# Patient Record
Sex: FEMALE | Race: WHITE | Employment: FULL TIME | URBAN - METROPOLITAN AREA
[De-identification: names, ages, dates, MRNs, and addresses within clinical notes are randomized per-mention and may not be internally consistent; named-entity substitution may affect disease eponyms.]

---

## 2017-07-25 ENCOUNTER — OFFICE VISIT (OUTPATIENT)
Dept: VASCULAR SURGERY | Facility: CLINIC | Age: 32
End: 2017-07-25
Payer: COMMERCIAL

## 2017-07-25 DIAGNOSIS — Z53.9 ERRONEOUS ENCOUNTER--DISREGARD: Primary | ICD-10-CM

## 2017-07-25 PROCEDURE — 99213 OFFICE O/P EST LOW 20 MIN: CPT | Performed by: SURGERY

## 2017-07-25 NOTE — MR AVS SNAPSHOT
After Visit Summary   7/25/2017    Blossom Hale    MRN: [id number]           Patient Information     Date Of Birth          1985        Visit Information        Provider Department      7/25/2017 2:00 PM London Pack MD [facility]      Today's Diagnoses     ERRONEOUS ENCOUNTER--DISREGARD    -  1       Follow-ups after your visit        Who to contact     If you have questions or need follow up information about today's clinic visit or your schedule please contact [facility] directly at [phone].  Normal or non-critical lab and imaging results will be communicated to you by Job[employer], letter or phone within 4 business days after the clinic has received the results. If you do not hear from us within 7 days, please contact the clinic through [employer] or phone. If you have a critical or abnormal lab result, we will notify you by phone as soon as possible.  Submit refill requests through [employer] or call your pharmacy and they will forward the refill request to us. Please allow 3 business days for your refill to be completed.          Additional Information About Your Visit        MyChart Information     [employer] gives you secure access to your electronic health record. If you see a primary care provider, you can also send messages to your care team and make appointments. If you have questions, please call your primary care clinic.  If you do not have a primary care provider, please call [phone] and they will assist you.        Care EveryWhere ID     This is your Care EveryWhere ID. This could be used by other organizations to access your [place] medical records  [id number]         Blood Pressure from Last 3 Encounters:   10/23/17 129/64   02/29/16 114/71   01/15/15 (!) 125/94    Weight from Last 3 Encounters:   10/23/17 102.2 kg (225 lb 5 oz)   02/29/16 106 kg (233 lb 9.6 oz)   01/14/15 95.3 kg  (210 lb)              Today, you had the following     No orders found for display       Primary Care Provider Office Phone # Fax #    Andrea Leach [phone]       [address] [address]        Equal Access to Services     HERMELINDA ODONNELL : Hadii aad ku hadmariamo Soomaali, waaxda luqadaha, qaybta kaalmada adeegyada, waxva hermosillo belindan nakul garzonandzrejinchole avilez. So [facility] [phone].    ATENCIÓN: Si habla español, tiene a castro disposición servicios gratuitos de asistencia lingüística. Llame al [phone].    We comply with applicable federal civil rights laws and [place] laws. We do not discriminate on the basis of race, color, national origin, age, disability, sex, sexual orientation, or gender identity.            Thank you!     Thank you for choosing [facility]  for your care. Our goal is always to provide you with excellent care. Hearing back from our patients is one way we can continue to improve our services. Please take a few minutes to complete the written survey that you may receive in the mail after your visit with us. Thank you!             Your Updated Medication List - Protect others around you: Learn how to safely use, store and throw away your medicines at www.disposemymeds.org.          This list is accurate as of 7/25/17 11:59 PM.  Always use your most recent med list.                   Brand Name Dispense Instructions for use Diagnosis    acetaminophen 325 MG tablet    TYLENOL     Take 325 mg by mouth every 6 hours as needed for mild pain        CALCIUM 600 PO      Take by mouth 2 times daily        EPINEPHrine 0.3 MG/0.3ML injection 2-pack    EPIPEN/ADRENACLICK/or ANY BX GENERIC EQUIV    2 each    Inject 0.3 mLs (0.3 mg) into the muscle once as needed for anaphylaxis        ergocalciferol 78603 UNITS capsule    ERGOCALCIFEROL     Take 50,000 Units by mouth once a week        metFORMIN 500 MG 24 hr tablet     GLUCOPHAGE-XR     Take 500 mg by mouth Take 2 tablets in AM and 2 tablets in PM        mometasone-formoterol 200-5 MCG/ACT oral inhaler    DULERA     Inhale 2 puffs into the lungs 2 times daily        PROAIR  (90 BASE) MCG/ACT Inhaler   Generic drug:  albuterol     1    INHALE 1 TO 2 PUFFS EVERY 4 TO 6 HOURS AS NEEDED    Intermittent asthma

## 2017-07-27 NOTE — PROGRESS NOTES
[facility] Office Note    Blossom Hale   1985    Blossom is familiar to me having undergone radiofrequency ablation of the right anterior accessory saphenous vein in 2016 for right leg pain and varicose veins.  72 hours following her procedure, she had an ultrasound confirming that the anterior accessory saphenous vein was closed.  She did not return for her six week follow-up appointment, therefore we were not able to assess whether she had improvement of her symptoms.    She presents now calls of ongoing pain in the anterolateral right thigh and extending into the lateral aspect of her right leg.  She says that the pain is somewhat improved but still very aggravating, despite the use of compression hose on a regular basis.  She has been exercising and has to elevate her leg to control the pain, difficult because of long hours she spends on her feet at work.  This is continuing to interfere with her activities of daily living in this way.    Physical exam  In her right lower extremity she has 4-5 mm varicosities coursing from the distal anterior right thigh, lateral to the right knee noted anterolateral aspect of her right leg.  These are smaller than appreciated prior to her surgery but still present.  There are no venous stasis changes.  She does have 1+ edema of her right ankle.    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  This is likely the vein that is continuing to give her symptoms.    Impression  Ongoing pain in right lower extremity secondary to right leg varicose veins.  She has had ablation of her right anterior accessory saphenous vein but this has not relieved her discomfort and needs treatment of the incompetent tributary.  I feel that this would be best treated with ultrasound-guided sclerotherapy or ambulatory phlebectomy.  Details of each including risks of each were  discussed.  She feels that sclerotherapy would be her preferred option.  Because of her previous treatment and failed conservative management, this treatment will be considered medically necessary.    Risks of allergic reaction, deep vein thrombosis, superficial thrombophlebitis, hyperpigmentation and the need for more than one session of sclerotherapy were discussed.  She appears to understand and is anxious to have this taken care of to relieve her pain.    BEBA Pack M.D.    Please send a copy of this dictation to MD BEBA Machado M.D.

## 2017-08-22 ENCOUNTER — OFFICE VISIT (OUTPATIENT)
Dept: VASCULAR SURGERY | Facility: CLINIC | Age: 32
End: 2017-08-22
Payer: COMMERCIAL

## 2017-08-22 DIAGNOSIS — Z53.9 ERRONEOUS ENCOUNTER--DISREGARD: Primary | ICD-10-CM

## 2017-08-22 NOTE — MR AVS SNAPSHOT
After Visit Summary   8/22/2017    Blossom Hale    MRN: [id number]           Patient Information     Date Of Birth          1985        Visit Information        Provider Department      8/22/2017 2:00 PM London Pack [facility]      Today's Diagnoses     ERRONEOUS ENCOUNTER--DISREGARD    -  1       Follow-ups after your visit        Who to contact     If you have questions or need follow up information about today's clinic visit or your schedule please contact [facility] directly at [phone].  Normal or non-critical lab and imaging results will be communicated to you by Plexxhart, letter or phone within 4 business days after the clinic has received the results. If you do not hear from us within 7 days, please contact the clinic through [employer] or phone. If you have a critical or abnormal lab result, we will notify you by phone as soon as possible.  Submit refill requests through [employer] or call your pharmacy and they will forward the refill request to us. Please allow 3 business days for your refill to be completed.          Additional Information About Your Visit        MyChart Information     [employer] gives you secure access to your electronic health record. If you see a primary care provider, you can also send messages to your care team and make appointments. If you have questions, please call your primary care clinic.  If you do not have a primary care provider, please call [phone] and they will assist you.        Care EveryWhere ID     This is your Care EveryWhere ID. This could be used by other organizations to access your [place] medical records  [id number]         Blood Pressure from Last 3 Encounters:   10/23/17 129/64   02/29/16 114/71   01/15/15 (!) 125/94    Weight from Last 3 Encounters:   10/23/17 102.2 kg (225 lb 5 oz)   02/29/16 106 kg (233 lb 9.6 oz)   01/14/15 95.3 kg  (210 lb)              Today, you had the following     No orders found for display       Primary Care Provider Office Phone # Fax #    Andrea Leach [phone]       [address] [address]        Equal Access to Services     HERMELINDA ODONNELL : Hadii aad ku hadmariamo Soomaali, waaxda luqadaha, qaybta kaalmada adeegyada, waxva hermosillo belindan nakul garzonandrzejnichole avilez. So [facility] [phone].    ATENCIÓN: Si habla español, tiene a castro disposición servicios gratuitos de asistencia lingüística. Llame al [phone].    We comply with applicable federal civil rights laws and [place] laws. We do not discriminate on the basis of race, color, national origin, age, disability, sex, sexual orientation, or gender identity.            Thank you!     Thank you for choosing [facility]  for your care. Our goal is always to provide you with excellent care. Hearing back from our patients is one way we can continue to improve our services. Please take a few minutes to complete the written survey that you may receive in the mail after your visit with us. Thank you!             Your Updated Medication List - Protect others around you: Learn how to safely use, store and throw away your medicines at www.disposemymeds.org.          This list is accurate as of 8/22/17 11:59 PM.  Always use your most recent med list.                   Brand Name Dispense Instructions for use Diagnosis    acetaminophen 325 MG tablet    TYLENOL     Take 325 mg by mouth every 6 hours as needed for mild pain        CALCIUM 600 PO      Take by mouth 2 times daily        EPINEPHrine 0.3 MG/0.3ML injection 2-pack    EPIPEN/ADRENACLICK/or ANY BX GENERIC EQUIV    2 each    Inject 0.3 mLs (0.3 mg) into the muscle once as needed for anaphylaxis        ergocalciferol 24981 UNITS capsule    ERGOCALCIFEROL     Take 50,000 Units by mouth once a week        metFORMIN 500 MG 24 hr tablet     GLUCOPHAGE-XR     Take 500 mg by mouth Take 2 tablets in AM and 2 tablets in PM        mometasone-formoterol 200-5 MCG/ACT oral inhaler    DULERA     Inhale 2 puffs into the lungs 2 times daily        PROAIR  (90 BASE) MCG/ACT Inhaler   Generic drug:  albuterol     1    INHALE 1 TO 2 PUFFS EVERY 4 TO 6 HOURS AS NEEDED    Intermittent asthma

## 2017-09-03 ENCOUNTER — HEALTH MAINTENANCE LETTER (OUTPATIENT)
Age: 32
End: 2017-09-03

## 2017-10-23 ENCOUNTER — HOSPITAL ENCOUNTER (EMERGENCY)
Facility: CLINIC | Age: 32
Discharge: HOME | End: 2017-10-23
Attending: EMERGENCY MEDICINE | Admitting: EMERGENCY MEDICINE
Payer: COMMERCIAL

## 2017-10-23 ENCOUNTER — APPOINTMENT (OUTPATIENT)
Dept: CT IMAGING | Facility: CLINIC | Age: 32
End: 2017-10-23
Attending: EMERGENCY MEDICINE
Payer: COMMERCIAL

## 2017-10-23 VITALS
DIASTOLIC BLOOD PRESSURE: 64 MMHG | WEIGHT: 225.31 LBS | OXYGEN SATURATION: 81 % | RESPIRATION RATE: 18 BRPM | BODY MASS INDEX: 39.91 KG/M2 | HEART RATE: 93 BPM | TEMPERATURE: 97.6 F | SYSTOLIC BLOOD PRESSURE: 129 MMHG

## 2017-10-23 DIAGNOSIS — R10.9 RIGHT FLANK PAIN: ICD-10-CM

## 2017-10-23 LAB
ALBUMIN UR-MCNC: NEGATIVE MG/DL
ANION GAP SERPL CALCULATED.3IONS-SCNC: 7 MMOL/L (ref 3–14)
APPEARANCE UR: CLEAR
BILIRUB UR QL STRIP: NEGATIVE
BUN SERPL-MCNC: 8 MG/DL (ref 7–30)
CALCIUM SERPL-MCNC: 9.1 MG/DL (ref 8.5–10.1)
CHLORIDE SERPL-SCNC: 108 MMOL/L (ref 94–109)
CO2 SERPL-SCNC: 24 MMOL/L (ref 20–32)
COLOR UR AUTO: COLORLESS
CREAT SERPL-MCNC: 0.6 MG/DL (ref 0.52–1.04)
ERYTHROCYTE [DISTWIDTH] IN BLOOD BY AUTOMATED COUNT: 12.8 % (ref 10–15)
GFR SERPL CREATININE-BSD FRML MDRD: >90 ML/MIN/1.7M2
GLUCOSE SERPL-MCNC: 86 MG/DL (ref 70–99)
GLUCOSE UR STRIP-MCNC: NEGATIVE MG/DL
HCG UR QL: NEGATIVE
HCT VFR BLD AUTO: 39.3 % (ref 35–47)
HGB BLD-MCNC: 12.6 G/DL (ref 11.7–15.7)
HGB UR QL STRIP: ABNORMAL
KETONES UR STRIP-MCNC: NEGATIVE MG/DL
LEUKOCYTE ESTERASE UR QL STRIP: NEGATIVE
MCH RBC QN AUTO: 28.2 PG (ref 26.5–33)
MCHC RBC AUTO-ENTMCNC: 32.1 G/DL (ref 31.5–36.5)
MCV RBC AUTO: 88 FL (ref 78–100)
MUCOUS THREADS #/AREA URNS LPF: PRESENT /LPF
NITRATE UR QL: NEGATIVE
PH UR STRIP: 8 PH (ref 5–7)
PLATELET # BLD AUTO: 225 10E9/L (ref 150–450)
POTASSIUM SERPL-SCNC: 3.7 MMOL/L (ref 3.4–5.3)
RBC # BLD AUTO: 4.47 10E12/L (ref 3.8–5.2)
RBC #/AREA URNS AUTO: 1 /HPF (ref 0–2)
SODIUM SERPL-SCNC: 139 MMOL/L (ref 133–144)
SOURCE: ABNORMAL
SP GR UR STRIP: 1 (ref 1–1.03)
UROBILINOGEN UR STRIP-MCNC: 0 MG/DL (ref 0–2)
WBC # BLD AUTO: 6.7 10E9/L (ref 4–11)
WBC #/AREA URNS AUTO: <1 /HPF (ref 0–2)

## 2017-10-23 PROCEDURE — 96361 HYDRATE IV INFUSION ADD-ON: CPT

## 2017-10-23 PROCEDURE — 80048 BASIC METABOLIC PNL TOTAL CA: CPT | Performed by: EMERGENCY MEDICINE

## 2017-10-23 PROCEDURE — 99285 EMERGENCY DEPT VISIT HI MDM: CPT | Mod: 25

## 2017-10-23 PROCEDURE — 74176 CT ABD & PELVIS W/O CONTRAST: CPT

## 2017-10-23 PROCEDURE — 96375 TX/PRO/DX INJ NEW DRUG ADDON: CPT

## 2017-10-23 PROCEDURE — 81001 URINALYSIS AUTO W/SCOPE: CPT | Performed by: EMERGENCY MEDICINE

## 2017-10-23 PROCEDURE — 96374 THER/PROPH/DIAG INJ IV PUSH: CPT

## 2017-10-23 PROCEDURE — 25000128 H RX IP 250 OP 636: Performed by: EMERGENCY MEDICINE

## 2017-10-23 PROCEDURE — 81025 URINE PREGNANCY TEST: CPT | Performed by: EMERGENCY MEDICINE

## 2017-10-23 PROCEDURE — 85027 COMPLETE CBC AUTOMATED: CPT | Performed by: EMERGENCY MEDICINE

## 2017-10-23 RX ORDER — BIOTIN 10 MG
TABLET ORAL
Status: ON HOLD | COMMUNITY
End: 2018-05-22

## 2017-10-23 RX ORDER — HYDROCODONE BITARTRATE AND ACETAMINOPHEN 5; 325 MG/1; MG/1
1-2 TABLET ORAL EVERY 4 HOURS PRN
Qty: 15 TABLET | Refills: 0 | Status: ON HOLD | OUTPATIENT
Start: 2017-10-23 | End: 2018-05-22

## 2017-10-23 RX ORDER — HYDROMORPHONE HYDROCHLORIDE 1 MG/ML
0.5 INJECTION, SOLUTION INTRAMUSCULAR; INTRAVENOUS; SUBCUTANEOUS
Status: DISCONTINUED | OUTPATIENT
Start: 2017-10-23 | End: 2017-10-23 | Stop reason: HOSPADM

## 2017-10-23 RX ORDER — ONDANSETRON 2 MG/ML
8 INJECTION INTRAMUSCULAR; INTRAVENOUS ONCE
Status: COMPLETED | OUTPATIENT
Start: 2017-10-23 | End: 2017-10-23

## 2017-10-23 RX ORDER — ONDANSETRON 4 MG/1
4 TABLET, ORALLY DISINTEGRATING ORAL EVERY 6 HOURS PRN
Qty: 10 TABLET | Refills: 0 | Status: SHIPPED | OUTPATIENT
Start: 2017-10-23 | End: 2017-10-26

## 2017-10-23 RX ADMIN — HYDROMORPHONE HYDROCHLORIDE 0.5 MG: 1 INJECTION, SOLUTION INTRAMUSCULAR; INTRAVENOUS; SUBCUTANEOUS at 11:24

## 2017-10-23 RX ADMIN — ONDANSETRON 8 MG: 2 INJECTION INTRAMUSCULAR; INTRAVENOUS at 11:24

## 2017-10-23 RX ADMIN — SODIUM CHLORIDE 1000 ML: 9 INJECTION, SOLUTION INTRAVENOUS at 11:24

## 2017-10-23 ASSESSMENT — ENCOUNTER SYMPTOMS
FLANK PAIN: 1
FREQUENCY: 1
VOMITING: 0
NAUSEA: 1
FEVER: 0
DYSURIA: 1

## 2017-10-23 NOTE — LETTER
To Whom it may concern:      Blossom Hale was seen in our Emergency Department today, 10/23/17.  She may return to work on 10/25/17.    Sincerely,        Carlos Velez MD

## 2017-10-23 NOTE — ED AVS SNAPSHOT
[facility] Emergency Department    [address]    [facility] [address]    Phone:  [phone]    Fax:  [phone]                                       Blossom Hale   MRN: [id number]    Department:  [facility] Emergency Department   Date of Visit:  10/23/2017           Patient Information     Date Of Birth          1985        Your diagnoses for this visit were:     Right flank pain        You were seen by Carlos Velez MD.      Follow-up Information     Follow up with Andrea Leach Schedule an appointment as soon as possible for a visit in 3 days.    Specialty:  Family Practice    Contact information:    [address] [address] [phone]          Discharge Instructions       RETURN TO THE ER FOR RE-EVALUATION IN 12-18 HOURS IF YOUR PAIN WORSENS, DEVELOP FEVER, RECURRENT VOMITING, INCREASED PAIN IN THE RIGHT LOWER ABDOMEN OR ANY OTHER CONCERNS.    Discharge Instructions  Abdominal Pain    Abdominal pain (belly pain) can be caused by many things. Your evaluation today does not show the exact cause for your pain. Your provider today has decided that it is unlikely your pain is due to a life threatening problem, or a problem requiring surgery or hospital admission. Sometimes those problems cannot be found right away, so it is very important that you follow up as directed.  Sometimes only the changes which occur over time allow the cause of your pain to be found.    Generally, every Emergency Department visit should have a follow-up clinic visit with either a primary or a specialty clinic/provider. Please follow-up as instructed by your emergency provider today. With abdominal pain, we often recommend very close follow-up, such as the following day.    ADULTS:  Return to the Emergency Department right away if:      You get an oral temperature above 102oF or as directed by your provider.    You have blood in  your stools. This may be bright red or appear as black, tarry stools.      You keep vomiting (throwing up) or cannot drink liquids.    You see blood when you vomit.     You cannot have a bowel movement or you cannot pass gas.    Your stomach gets bloated or bigger.    Your skin or the whites of your eyes look yellow.    You faint.    You have bloody, frequent or painful urination (peeing).    You have new symptoms or anything that worries you.    CHILDREN:  Return to the Emergency Department right away if your child has any of the above-listed symptoms or the following:      Pushes your hand away or screams/cries when his/her belly is touched.    You notice your child is very fussy or weak.    Your child is very tired and is too tired to eat or drink.    Your child is dehydrated.  Signs of dehydration can be:  o Significant change in the amount of wet diapers/urine.  o Your infant or child starts to have dry mouth and lips, or no saliva (spit) or tears.    PREGNANT WOMEN:  Return to the Emergency Department right away if you have any of the above-listed symptoms or the following:      You have bleeding, leaking fluid or passing tissue from the vagina.    You have worse pain or cramping, or pain in your shoulder or back.    You have vomiting that will not stop.    You have a temperature of 100oF or more.    Your baby is not moving as much as usual.    You faint.    You get a bad headache with or without eye problems and abdominal pain.    You have a seizure.    You have unusual discharge from your vagina and abdominal pain.    Abdominal pain is pretty common during pregnancy.  Your pain may or may not be related to your pregnancy. You should follow-up closely with your OB provider so they can evaluate you and your baby.  Until you follow-up with your regular provider, do the following:       Avoid sex and do not put anything in your vagina.    Drink clear fluids.    Only take medications approved by your  "provider.    MORE INFORMATION:    Appendicitis:  A possible cause of abdominal pain in any person who still has their appendix is acute appendicitis. Appendicitis is often hard to diagnose.  Testing does not always rule out early appendicitis or other causes of abdominal pain. Close follow-up with your provider and re-evaluations may be needed to figure out the reason for your abdominal pain.    Follow-up:  It is very important that you make an appointment with your clinic and go to the appointment.  If you do not follow-up with your primary provider, it may result in missing an important development which could result in permanent injury or disability and/or lasting pain.  If there is any problem keeping your appointment, call your provider or return to the Emergency Department.    Medications:  Take your medications as directed by your provider today.  Before using over-the-counter medications, ask your provider and make sure to take the medications as directed.  If you have any questions about medications, ask your provider.    Diet:  Resume your normal diet as much as possible, but do not eat fried, fatty or spicy foods while you have pain.  Do not drink alcohol or have caffeine.  Do not smoke tobacco.    Probiotics: If you have been given an antibiotic, you may want to also take a probiotic pill or eat yogurt with live cultures. Probiotics have \"good bacteria\" to help your intestines stay healthy. Studies have shown that probiotics help prevent diarrhea (loose stools) and other intestine problems (including C. diff infection) when you take antibiotics. You can buy these without a prescription in the pharmacy section of the store.     If you were given a prescription for medicine here today, be sure to read all of the information (including the package insert) that comes with your prescription.  This will include important information about the medicine, its side effects, and any warnings that you need to know " about.  The pharmacist who fills the prescription can provide more information and answer questions you may have about the medicine.  If you have questions or concerns that the pharmacist cannot address, please call or return to the Emergency Department.       Remember that you can always come back to the Emergency Department if you are not able to see your regular provider in the amount of time listed above, if you get any new symptoms, or if there is anything that worries you.      24 Hour Appointment Hotline       To make an appointment at any [facility], call [phone] ([phone]). If you don't have a family doctor or clinic, we will help you find one. [facility] clinics are conveniently located to serve the needs of you and your family.             Review of your medicines      START taking        Dose / Directions Last dose taken    HYDROcodone-acetaminophen 5-325 MG per tablet   Commonly known as:  NORCO   Dose:  1-2 tablet   Quantity:  15 tablet        Take 1-2 tablets by mouth every 4 hours as needed for moderate to severe pain   Refills:  0        ondansetron 4 MG ODT tab   Commonly known as:  ZOFRAN ODT   Dose:  4 mg   Quantity:  10 tablet        Take 1 tablet (4 mg) by mouth every 6 hours as needed for nausea   Refills:  0          Our records show that you are taking the medicines listed below. If these are incorrect, please call your family doctor or clinic.        Dose / Directions Last dose taken    acetaminophen 325 MG tablet   Commonly known as:  TYLENOL   Dose:  325 mg        Take 325 mg by mouth every 6 hours as needed for mild pain   Refills:  0        Biotin 10 MG Tabs tablet        Refills:  0        CALCIUM 600 PO        Take by mouth 2 times daily   Refills:  0        EPINEPHrine 0.3 MG/0.3ML injection 2-pack   Commonly known as:  EPIPEN/ADRENACLICK/or ANY BX GENERIC EQUIV   Dose:  0.3 mg   Quantity:  2 each        Inject 0.3 mLs (0.3 mg) into the muscle once as needed for  anaphylaxis   Refills:  0        ergocalciferol 28795 UNITS capsule   Commonly known as:  ERGOCALCIFEROL   Dose:  14612 Units        Take 50,000 Units by mouth once a week   Refills:  0        metFORMIN 500 MG 24 hr tablet   Commonly known as:  GLUCOPHAGE-XR   Dose:  500 mg        Take 500 mg by mouth Take 2 tablets in AM and 2 tablets in PM   Refills:  0        mometasone-formoterol 200-5 MCG/ACT oral inhaler   Commonly known as:  DULERA   Dose:  2 puff        Inhale 2 puffs into the lungs 2 times daily   Refills:  0        PROAIR  (90 BASE) MCG/ACT Inhaler   Quantity:  1   Generic drug:  albuterol        INHALE 1 TO 2 PUFFS EVERY 4 TO 6 HOURS AS NEEDED   Refills:  5                Prescriptions were sent or printed at these locations (2 Prescriptions)                   Other Prescriptions                Printed at Department/Unit printer (2 of 2)         HYDROcodone-acetaminophen (NORCO) 5-325 MG per tablet               ondansetron (ZOFRAN ODT) 4 MG ODT tab                Procedures and tests performed during your visit     Abd/pelvis CT no contrast - Stone Protocol    Basic metabolic panel (BMP)    CBC (platelets, no diff)    HCG qualitative urine    Peripheral IV catheter    UA with Microscopic      Orders Needing Specimen Collection     None      Pending Results     No orders found from 10/21/2017 to 10/24/2017.            Pending Culture Results     No orders found from 10/21/2017 to 10/24/2017.            Pending Results Instructions     If you had any lab results that were not finalized at the time of your Discharge, you can call the ED Lab Result RN at [phone]. You will be contacted by this team for any positive Lab results or changes in treatment. The nurses are available 7 days a week from 10A to 6:30P.  You can leave a message 24 hours per day and they will return your call.        Test Results From Your Hospital Stay        10/23/2017 10:33 AM      Component Results     Component Value Ref  Range & Units Status    Color Urine Colorless  Final    Appearance Urine Clear  Final    Glucose Urine Negative NEG^Negative mg/dL Final    Bilirubin Urine Negative NEG^Negative Final    Ketones Urine Negative NEG^Negative mg/dL Final    Specific Gravity Urine 1.001 (L) 1.003 - 1.035 Final    Blood Urine Moderate (A) NEG^Negative Final    pH Urine 8.0 (H) 5.0 - 7.0 pH Final    Protein Albumin Urine Negative NEG^Negative mg/dL Final    Urobilinogen mg/dL 0.0 0.0 - 2.0 mg/dL Final    Nitrite Urine Negative NEG^Negative Final    Leukocyte Esterase Urine Negative NEG^Negative Final    Source Midstream Urine  Final    WBC Urine <1 0 - 2 /HPF Final    RBC Urine 1 0 - 2 /HPF Final    Mucous Urine Present (A) NEG^Negative /LPF Final         10/23/2017 10:32 AM      Component Results     Component Value Ref Range & Units Status    HCG Qual Urine Negative NEG^Negative Final    This test is for screening purposes.  Results should be interpreted along with   the clinical picture.  Confirmation testing is available if warranted by   ordering [id number], HCG Quantitative Pregnancy.           10/23/2017 11:46 AM      Component Results     Component Value Ref Range & Units Status    Sodium 139 133 - 144 mmol/L Final    Potassium 3.7 3.4 - 5.3 mmol/L Final    Chloride 108 94 - 109 mmol/L Final    Carbon Dioxide 24 20 - 32 mmol/L Final    Anion Gap 7 3 - 14 mmol/L Final    Glucose 86 70 - 99 mg/dL Final    Urea Nitrogen 8 7 - 30 mg/dL Final    Creatinine 0.60 0.52 - 1.04 mg/dL Final    GFR Estimate >90 >60 mL/min/1.7m2 Final    Non  GFR Calc    GFR Estimate If Black >90 >60 mL/min/1.7m2 Final    African American GFR Calc    Calcium 9.1 8.5 - 10.1 mg/dL Final         10/23/2017 11:29 AM      Component Results     Component Value Ref Range & Units Status    WBC 6.7 4.0 - 11.0 10e9/L Final    RBC Count 4.47 3.8 - 5.2 10e12/L Final    Hemoglobin 12.6 11.7 - 15.7 g/dL Final    Hematocrit 39.3 35.0 - 47.0 % Final    MCV 88 78 -  100 fl Final    MCH 28.2 26.5 - 33.0 pg Final    MCHC 32.1 31.5 - 36.5 g/dL Final    RDW 12.8 10.0 - 15.0 % Final    Platelet Count 225 150 - 450 10e9/L Final         10/23/2017 12:53 PM      Narrative     CT ABDOMEN AND PELVIS WITHOUT CONTRAST  10/23/2017 12:09 PM     HISTORY: Right flank pain.    TECHNIQUE:  Noncontrast images of the abdomen and pelvis. Radiation  dose for this scan was reduced using automated exposure control,  adjustment of the mA and/or kV according to patient size, or iterative  reconstruction technique.    COMPARISON: 5/28/2011.    FINDINGS: The gallbladder has been removed. The unenhanced liver,  spleen, pancreas, and adrenal glands are unremarkable. There is no  hydronephrosis or hydroureter on either side. No urinary tract calculi  are identified. No abdominal or retroperitoneal adenopathy.    Calcification is noted in the cecum, at the origin of the appendix.  The appendix is otherwise normal.    No bowel obstruction, free air, or ascites. No pelvic adenopathy. An  IUD is present.        Impression     IMPRESSION:  1. An appendicolith is noted, but there is no evidence of  appendicitis.  2. Prior cholecystectomy and IUD placement.    MAGDALENA WHALEY MD                Clinical Quality Measure: Blood Pressure Screening     Your blood pressure was checked while you were in the emergency department today. The last reading we obtained was  BP: 113/63 . Please read the guidelines below about what these numbers mean and what you should do about them.  If your systolic blood pressure (the top number) is less than 120 and your diastolic blood pressure (the bottom number) is less than 80, then your blood pressure is normal. There is nothing more that you need to do about it.  If your systolic blood pressure (the top number) is 120-139 or your diastolic blood pressure (the bottom number) is 80-89, your blood pressure may be higher than it should be. You should have your blood pressure rechecked  within a year by a primary care provider.  If your systolic blood pressure (the top number) is 140 or greater or your diastolic blood pressure (the bottom number) is 90 or greater, you may have high blood pressure. High blood pressure is treatable, but if left untreated over time it can put you at risk for heart attack, stroke, or kidney failure. You should have your blood pressure rechecked by a primary care provider within the next 4 weeks.  If your provider in the emergency department today gave you specific instructions to follow-up with your doctor or provider even sooner than that, you should follow that instruction and not wait for up to 4 weeks for your follow-up visit.        Thank you for choosing [facility]       Thank you for choosing [facility] for your care. Our goal is always to provide you with excellent care. Hearing back from our patients is one way we can continue to improve our services. Please take a few minutes to complete the written survey that you may receive in the mail after you visit with us. Thank you!        [facility] Information     [employer] gives you secure access to your electronic health record. If you see a primary care provider, you can also send messages to your care team and make appointments. If you have questions, please call your primary care clinic.  If you do not have a primary care provider, please call [phone] and they will assist you.        Care EveryWhere ID     This is your Care EveryWhere ID. This could be used by other organizations to access your [facility] medical records  [id number]        Equal Access to Services     HERMEILNDA ODONNELL : Hadii mary Laws, benny garza, qadavi rueda. So [facility] [phone].    ATENCIÓN: Si habla español, tiene a castro disposición servicios gratuitos de asistencia lingüística. Logan al [phone].    We comply with applicable federal civil rights laws and [place] laws.  We do not discriminate on the basis of race, color, national origin, age, disability, sex, sexual orientation, or gender identity.            After Visit Summary       This is your record. Keep this with you and show to your community pharmacist(s) and doctor(s) at your next visit.

## 2017-10-23 NOTE — ED NOTES
Diagnosed with UTI last Friday, now has left sided flank pain.  Feels like previous kidney stones.

## 2017-10-23 NOTE — ED PROVIDER NOTES
History     Chief Complaint:  Flank Pain    HPI   Blossom Hale is a 31 year old female with a history of kidney stones who presents with right flank pain. The patient state she first noted symptoms a week and a half ago with urgency and low abdomen pressure. She received an evaluation at [facility] 3 days ago for this, and was diagnosed with a bladder infection and started on Macrobid twice a day. She has been taking this medication as prescribed with no improvement of her symptoms, and presents today after developing sudden onset of right flank pain and dysuria. The pain in her right flank radiates up and down on her right sided low and mid back, but does not wrap around to the front. It is intermittent, worsend by sitting, and improved with laying. She also has been nauseated. The patient denies fevers, vomiting, and states no other concerns at this time.       Allergies:  Dust Mite Extract  Latex  Levaquin  Morphine     Medications:    Biotin 10 MG TABS tablet  ergocalciferol (ERGOCALCIFEROL) 17196 UNITS capsule  acetaminophen (TYLENOL) 325 MG tablet  metFORMIN (GLUCOPHAGE-XR) 500 MG 24 hr tablet  Calcium Carbonate (CALCIUM 600 PO)  mometasone-formoterol (DULERA) 200-5 MCG/ACT oral inhaler  EPINEPHrine (EPIPEN) 0.3 MG/0.3ML injection  PROAIR  (90 BASE) MCG/ACT IN AERS    Past Medical History:    Back strain   Hyperlipidemia   Kidney duplication   Miscarriage   Moderate major depression (H)   Obesity   Renal stones   S/P colonoscopy     Past Surgical History:    Cholecystectomy  Hernia repair  Left inguinal hernia repair    Family History:    GI disease  Cancer  MI  CHF  Diabetes  Amyloid tumor    Social History:  Marital Status:    Smoking status: Former smoker  Alcohol use: Yes     Review of Systems   Constitutional: Negative for fever.   Gastrointestinal: Positive for nausea. Negative for vomiting.   Genitourinary: Positive for dysuria, flank pain, frequency and urgency.   All  other systems reviewed and are negative.    Physical Exam     Patient Vitals for the past 24 hrs:   BP Temp Temp src Pulse Heart Rate Resp SpO2 Weight   10/23/17 1330 129/64 - - 93 93 - - -   10/23/17 1315 133/87 - - - - - - -   10/23/17 1300 118/70 - - - - - (!) 81 % -   10/23/17 1245 118/71 - - - - - - -   10/23/17 1230 113/63 - - 72 72 - 96 % -   10/23/17 1215 113/59 - - - - - 95 % -   10/23/17 1145 114/60 - - - - - 99 % -   10/23/17 1137 126/72 - - - - - 98 % -   10/23/17 1130 128/66 - - 90 90 - 98 % -   10/23/17 1118 135/75 - - - - - 99 % -   10/23/17 1011 (!) 146/108 97.6  F (36.4  C) Oral 106 - 18 100 % 102.2 kg (225 lb 5 oz)      Physical Exam    Constitutional:  Pleasant, age appropriate female  HEENT:    Oropharynx is moist, without lesions or trismus.  Eyes:    Conjunctiva normal, PERRL  Neck:    Supple, no meningismus.     CV:     Regular rate and rhythm.      No murmurs, rubs or gallops.     No lower extremity edema.  PULM:    Clear to auscultation bilateral.       No respiratory distress.      Good air exchange.     No rales or wheezing.  ABD:    Soft, non-distended.       Mild tenderness in the right upper and lower quadrant.      Negative Rovsing's sign.     Bowel sounds normal.     No pulsatile masses.       No rebound, guarding or rigidity.     No CVA tenderness.      No hepatosplenomegaly.  MSK:     No gross deformity to all four extremities.   LYMPH:   No cervical lymphadenopathy.  NEURO:   Alert.  Good muscular tone, no atrophy.   Skin:    Warm, dry and intact.    Psych:    Mood is good and affect is appropriate.      Emergency Department Course   Imaging:  Radiology findings were communicated with the patient who voiced understanding of the findings.  Abd/pelvis CT no contrast - Stone Protocol   Final Result   IMPRESSION:   1. An appendicolith is noted, but there is no evidence of   appendicitis.   2. Prior cholecystectomy and IUD placement.      MAGDALENA WHALEY MD          Laboratory:  Laboratory findings were communicated with the patient who voiced understanding of the findings.  BMP:AWNL (Creatinine 0.60)    CBC: AWNL. (WBC 6.7, HGB 12.6, )   UA: Clear yellow urine, specific gravity 1.00, urine blood moderate, pH 8.0 (H), mucus present, otherwise WNL  HCG Qualitative Urine: negative     Interventions:  1124: NS Bolus 1,000mL IV   1124: Dilaudid, 0.5 mg, IV injection   1124: Zofran, 8 mg, IV      Emergency Department Course:  Nursing notes and vitals reviewed.  I performed an exam of the patient as documented above.   The patient was sent for a abdominal/pelcis CT without contrast while in the emergency department, results above.   IV was inserted and blood was drawn for laboratory testing, results above.  The patient provided a urine sample here in the emergency department. This was sent for laboratory testing, findings above.   Findings and plan explained to the patient. Patient discharged home with instructions regarding supportive care, medications, and reasons to return. The importance of close follow-up was reviewed.   I personally reviewed the laboratory and imaging results with the Patient and answered all related questions prior to discharge.      Impression & Plan      Medical Decision Making:   Blossom Hale is a 31 year old female who presents to the emergency department with outpatient uranalysis concerning for infection. Patient was not improving on Macrobid with developing right flank pain. Her clinical findings are not suggestive of pyelonephritis. Urinalysis shows no laboratory signs of infection. CT scan was as done to evaluate for ureteral stone. There is no radiographic evidences of pyelonephritis or perinephric abscess. She does have a large appendicolith but there are no signs of appendicitis or chayo-appendicile inflammatory changes. Repeat abdominal exam is benign with mild right sided tenderness. I explained to her that the exact cause of her  symptoms is uncertain. I explained the possibility of false negative CT scan for appendicitis, though do not feel that surgical intervention is necessary. Patient will return to the emergency department in 12-18 hours if symptoms worsen or she has new pain to rule out appendicitis.     Diagnosis:    ICD-10-CM    1. Right flank pain R10.9       Disposition:   Discharged to home with the below prescription       Discharge Medications:  New Prescriptions    HYDROCODONE-ACETAMINOPHEN (NORCO) 5-325 MG PER TABLET    Take 1-2 tablets by mouth every 4 hours as needed for moderate to severe pain    ONDANSETRON (ZOFRAN ODT) 4 MG ODT TAB    Take 1 tablet (4 mg) by mouth every 6 hours as needed for nausea      Scribe Disclosure:  I, Yoshi Vargas, am serving as a scribe at 11:38 AM on 10/23/2017 to document services personally performed by Carlos Velez MD, based on my observations and the provider's statements to me.   [facility] EMERGENCY DEPARTMENT       Carlos Velez MD  10/27/17 0517

## 2017-10-23 NOTE — DISCHARGE INSTRUCTIONS
RETURN TO THE ER FOR RE-EVALUATION IN 12-18 HOURS IF YOUR PAIN WORSENS, DEVELOP FEVER, RECURRENT VOMITING, INCREASED PAIN IN THE RIGHT LOWER ABDOMEN OR ANY OTHER CONCERNS.    Discharge Instructions  Abdominal Pain    Abdominal pain (belly pain) can be caused by many things. Your evaluation today does not show the exact cause for your pain. Your provider today has decided that it is unlikely your pain is due to a life threatening problem, or a problem requiring surgery or hospital admission. Sometimes those problems cannot be found right away, so it is very important that you follow up as directed.  Sometimes only the changes which occur over time allow the cause of your pain to be found.    Generally, every Emergency Department visit should have a follow-up clinic visit with either a primary or a specialty clinic/provider. Please follow-up as instructed by your emergency provider today. With abdominal pain, we often recommend very close follow-up, such as the following day.    ADULTS:  Return to the Emergency Department right away if:      You get an oral temperature above 102oF or as directed by your provider.    You have blood in your stools. This may be bright red or appear as black, tarry stools.      You keep vomiting (throwing up) or cannot drink liquids.    You see blood when you vomit.     You cannot have a bowel movement or you cannot pass gas.    Your stomach gets bloated or bigger.    Your skin or the whites of your eyes look yellow.    You faint.    You have bloody, frequent or painful urination (peeing).    You have new symptoms or anything that worries you.    CHILDREN:  Return to the Emergency Department right away if your child has any of the above-listed symptoms or the following:      Pushes your hand away or screams/cries when his/her belly is touched.    You notice your child is very fussy or weak.    Your child is very tired and is too tired to eat or drink.    Your child is dehydrated.  Signs  of dehydration can be:  o Significant change in the amount of wet diapers/urine.  o Your infant or child starts to have dry mouth and lips, or no saliva (spit) or tears.    PREGNANT WOMEN:  Return to the Emergency Department right away if you have any of the above-listed symptoms or the following:      You have bleeding, leaking fluid or passing tissue from the vagina.    You have worse pain or cramping, or pain in your shoulder or back.    You have vomiting that will not stop.    You have a temperature of 100oF or more.    Your baby is not moving as much as usual.    You faint.    You get a bad headache with or without eye problems and abdominal pain.    You have a seizure.    You have unusual discharge from your vagina and abdominal pain.    Abdominal pain is pretty common during pregnancy.  Your pain may or may not be related to your pregnancy. You should follow-up closely with your OB provider so they can evaluate you and your baby.  Until you follow-up with your regular provider, do the following:       Avoid sex and do not put anything in your vagina.    Drink clear fluids.    Only take medications approved by your provider.    MORE INFORMATION:    Appendicitis:  A possible cause of abdominal pain in any person who still has their appendix is acute appendicitis. Appendicitis is often hard to diagnose.  Testing does not always rule out early appendicitis or other causes of abdominal pain. Close follow-up with your provider and re-evaluations may be needed to figure out the reason for your abdominal pain.    Follow-up:  It is very important that you make an appointment with your clinic and go to the appointment.  If you do not follow-up with your primary provider, it may result in missing an important development which could result in permanent injury or disability and/or lasting pain.  If there is any problem keeping your appointment, call your provider or return to the Emergency Department.    Medications:   "Take your medications as directed by your provider today.  Before using over-the-counter medications, ask your provider and make sure to take the medications as directed.  If you have any questions about medications, ask your provider.    Diet:  Resume your normal diet as much as possible, but do not eat fried, fatty or spicy foods while you have pain.  Do not drink alcohol or have caffeine.  Do not smoke tobacco.    Probiotics: If you have been given an antibiotic, you may want to also take a probiotic pill or eat yogurt with live cultures. Probiotics have \"good bacteria\" to help your intestines stay healthy. Studies have shown that probiotics help prevent diarrhea (loose stools) and other intestine problems (including C. diff infection) when you take antibiotics. You can buy these without a prescription in the pharmacy section of the store.     If you were given a prescription for medicine here today, be sure to read all of the information (including the package insert) that comes with your prescription.  This will include important information about the medicine, its side effects, and any warnings that you need to know about.  The pharmacist who fills the prescription can provide more information and answer questions you may have about the medicine.  If you have questions or concerns that the pharmacist cannot address, please call or return to the Emergency Department.       Remember that you can always come back to the Emergency Department if you are not able to see your regular provider in the amount of time listed above, if you get any new symptoms, or if there is anything that worries you.    "

## 2017-10-23 NOTE — ED AVS SNAPSHOT
[facility] Emergency Department    [address]    [facility] [address]    Phone:  [phone]    Fax:  [phone]                                       Blossom Hale   MRN: [id number]    Department:  [facility] Emergency Department   Date of Visit:  10/23/2017           After Visit Summary Signature Page     I have received my discharge instructions, and my questions have been answered. I have discussed any challenges I see with this plan with the nurse or doctor.    ..........................................................................................................................................  Patient/Patient Representative Signature      ..........................................................................................................................................  Patient Representative Print Name and Relationship to Patient    ..................................................               ................................................  Date                                            Time    ..........................................................................................................................................  Reviewed by Signature/Title    ...................................................              ..............................................  Date                                                            Time

## 2018-05-21 ENCOUNTER — HOSPITAL ENCOUNTER (OUTPATIENT)
Facility: CLINIC | Age: 33
Setting detail: OBSERVATION
Discharge: HOME | End: 2018-05-23
Attending: EMERGENCY MEDICINE | Admitting: INTERNAL MEDICINE
Payer: COMMERCIAL

## 2018-05-21 ENCOUNTER — NURSE TRIAGE (OUTPATIENT)
Dept: NURSING | Facility: CLINIC | Age: 33
End: 2018-05-21

## 2018-05-21 ENCOUNTER — APPOINTMENT (OUTPATIENT)
Dept: CT IMAGING | Facility: CLINIC | Age: 33
End: 2018-05-21
Attending: EMERGENCY MEDICINE
Payer: COMMERCIAL

## 2018-05-21 DIAGNOSIS — R79.89 ELEVATED LFTS: ICD-10-CM

## 2018-05-21 DIAGNOSIS — K62.5 RECTAL BLEEDING: ICD-10-CM

## 2018-05-21 LAB
ALBUMIN SERPL-MCNC: 4 G/DL (ref 3.4–5)
ALBUMIN UR-MCNC: NEGATIVE MG/DL
ALP SERPL-CCNC: 72 U/L (ref 40–150)
ALT SERPL W P-5'-P-CCNC: 108 U/L (ref 0–50)
ANION GAP SERPL CALCULATED.3IONS-SCNC: 8 MMOL/L (ref 3–14)
APPEARANCE UR: CLEAR
APTT PPP: 30 SEC (ref 22–37)
AST SERPL W P-5'-P-CCNC: 59 U/L (ref 0–45)
B-HCG FREE SERPL-ACNC: <5 IU/L
BACTERIA #/AREA URNS HPF: ABNORMAL /HPF
BASOPHILS # BLD AUTO: 0.1 10E9/L (ref 0–0.2)
BASOPHILS NFR BLD AUTO: 0.7 %
BILIRUB SERPL-MCNC: 0.3 MG/DL (ref 0.2–1.3)
BILIRUB UR QL STRIP: NEGATIVE
BUN SERPL-MCNC: 13 MG/DL (ref 7–30)
CALCIUM SERPL-MCNC: 9 MG/DL (ref 8.5–10.1)
CHLORIDE SERPL-SCNC: 108 MMOL/L (ref 94–109)
CO2 SERPL-SCNC: 24 MMOL/L (ref 20–32)
COLOR UR AUTO: ABNORMAL
CREAT SERPL-MCNC: 0.62 MG/DL (ref 0.52–1.04)
DIFFERENTIAL METHOD BLD: NORMAL
EOSINOPHIL # BLD AUTO: 0.4 10E9/L (ref 0–0.7)
EOSINOPHIL NFR BLD AUTO: 3.7 %
ERYTHROCYTE [DISTWIDTH] IN BLOOD BY AUTOMATED COUNT: 13.2 % (ref 10–15)
GFR SERPL CREATININE-BSD FRML MDRD: >90 ML/MIN/1.7M2
GLUCOSE SERPL-MCNC: 81 MG/DL (ref 70–99)
GLUCOSE UR STRIP-MCNC: NEGATIVE MG/DL
HCT VFR BLD AUTO: 41 % (ref 35–47)
HGB BLD-MCNC: 13 G/DL (ref 11.7–15.7)
HGB UR QL STRIP: ABNORMAL
IMM GRANULOCYTES # BLD: 0 10E9/L (ref 0–0.4)
IMM GRANULOCYTES NFR BLD: 0.3 %
INR PPP: 0.99 (ref 0.86–1.14)
KETONES UR STRIP-MCNC: NEGATIVE MG/DL
LACTATE BLD-SCNC: 1.1 MMOL/L (ref 0.7–2)
LEUKOCYTE ESTERASE UR QL STRIP: NEGATIVE
LIPASE SERPL-CCNC: 261 U/L (ref 73–393)
LYMPHOCYTES # BLD AUTO: 3.1 10E9/L (ref 0.8–5.3)
LYMPHOCYTES NFR BLD AUTO: 32 %
MCH RBC QN AUTO: 28.4 PG (ref 26.5–33)
MCHC RBC AUTO-ENTMCNC: 31.7 G/DL (ref 31.5–36.5)
MCV RBC AUTO: 90 FL (ref 78–100)
MONOCYTES # BLD AUTO: 0.6 10E9/L (ref 0–1.3)
MONOCYTES NFR BLD AUTO: 6.5 %
MUCOUS THREADS #/AREA URNS LPF: PRESENT /LPF
NEUTROPHILS # BLD AUTO: 5.5 10E9/L (ref 1.6–8.3)
NEUTROPHILS NFR BLD AUTO: 56.8 %
NITRATE UR QL: NEGATIVE
NRBC # BLD AUTO: 0 10*3/UL
NRBC BLD AUTO-RTO: 0 /100
PH UR STRIP: 6 PH (ref 5–7)
PLATELET # BLD AUTO: 256 10E9/L (ref 150–450)
POTASSIUM SERPL-SCNC: 4.1 MMOL/L (ref 3.4–5.3)
PROT SERPL-MCNC: 7.5 G/DL (ref 6.8–8.8)
RBC # BLD AUTO: 4.57 10E12/L (ref 3.8–5.2)
RBC #/AREA URNS AUTO: 3 /HPF (ref 0–2)
SODIUM SERPL-SCNC: 140 MMOL/L (ref 133–144)
SOURCE: ABNORMAL
SP GR UR STRIP: 1 (ref 1–1.03)
SQUAMOUS #/AREA URNS AUTO: 1 /HPF (ref 0–1)
UROBILINOGEN UR STRIP-MCNC: 0 MG/DL (ref 0–2)
WBC # BLD AUTO: 9.7 10E9/L (ref 4–11)
WBC #/AREA URNS AUTO: <1 /HPF (ref 0–5)

## 2018-05-21 PROCEDURE — 85730 THROMBOPLASTIN TIME PARTIAL: CPT | Performed by: EMERGENCY MEDICINE

## 2018-05-21 PROCEDURE — 99285 EMERGENCY DEPT VISIT HI MDM: CPT | Mod: 25

## 2018-05-21 PROCEDURE — 25000128 H RX IP 250 OP 636: Performed by: EMERGENCY MEDICINE

## 2018-05-21 PROCEDURE — 85025 COMPLETE CBC W/AUTO DIFF WBC: CPT | Performed by: EMERGENCY MEDICINE

## 2018-05-21 PROCEDURE — 81001 URINALYSIS AUTO W/SCOPE: CPT | Performed by: EMERGENCY MEDICINE

## 2018-05-21 PROCEDURE — 80053 COMPREHEN METABOLIC PANEL: CPT | Performed by: EMERGENCY MEDICINE

## 2018-05-21 PROCEDURE — 74177 CT ABD & PELVIS W/CONTRAST: CPT

## 2018-05-21 PROCEDURE — 85610 PROTHROMBIN TIME: CPT | Performed by: EMERGENCY MEDICINE

## 2018-05-21 PROCEDURE — 96374 THER/PROPH/DIAG INJ IV PUSH: CPT | Mod: 59

## 2018-05-21 PROCEDURE — 96361 HYDRATE IV INFUSION ADD-ON: CPT

## 2018-05-21 PROCEDURE — 84702 CHORIONIC GONADOTROPIN TEST: CPT

## 2018-05-21 PROCEDURE — 83605 ASSAY OF LACTIC ACID: CPT | Performed by: EMERGENCY MEDICINE

## 2018-05-21 PROCEDURE — 83690 ASSAY OF LIPASE: CPT | Performed by: EMERGENCY MEDICINE

## 2018-05-21 RX ORDER — IOPAMIDOL 755 MG/ML
500 INJECTION, SOLUTION INTRAVASCULAR ONCE
Status: COMPLETED | OUTPATIENT
Start: 2018-05-21 | End: 2018-05-21

## 2018-05-21 RX ORDER — SODIUM CHLORIDE 9 MG/ML
1000 INJECTION, SOLUTION INTRAVENOUS CONTINUOUS
Status: DISCONTINUED | OUTPATIENT
Start: 2018-05-21 | End: 2018-05-22

## 2018-05-21 RX ORDER — LIRAGLUTIDE 6 MG/ML
0.6 INJECTION SUBCUTANEOUS AT BEDTIME
COMMUNITY

## 2018-05-21 RX ORDER — ONDANSETRON 2 MG/ML
4 INJECTION INTRAMUSCULAR; INTRAVENOUS
Status: COMPLETED | OUTPATIENT
Start: 2018-05-21 | End: 2018-05-21

## 2018-05-21 RX ADMIN — SODIUM CHLORIDE 65 ML: 9 INJECTION, SOLUTION INTRAVENOUS at 23:36

## 2018-05-21 RX ADMIN — SODIUM CHLORIDE 1000 ML: 9 INJECTION, SOLUTION INTRAVENOUS at 22:25

## 2018-05-21 RX ADMIN — ONDANSETRON 4 MG: 2 INJECTION, SOLUTION INTRAMUSCULAR; INTRAVENOUS at 22:26

## 2018-05-21 RX ADMIN — IOPAMIDOL 100 ML: 755 INJECTION, SOLUTION INTRAVENOUS at 23:36

## 2018-05-21 ASSESSMENT — ENCOUNTER SYMPTOMS
FEVER: 0
CONSTIPATION: 0
SHORTNESS OF BREATH: 0
COUGH: 0
DIFFICULTY URINATING: 0
ANAL BLEEDING: 1
DIZZINESS: 0
ABDOMINAL PAIN: 1
LIGHT-HEADEDNESS: 0
FREQUENCY: 0
HEMATURIA: 1
NAUSEA: 1
VOMITING: 1
DYSURIA: 0
DIARRHEA: 1
SORE THROAT: 0

## 2018-05-21 NOTE — IP AVS SNAPSHOT
MRN:[id number]                      After Visit Summary   5/21/2018    Blossom Hale    MRN: [id number]           Thank you!     Thank you for choosing [facility] for your care. Our goal is always to provide you with excellent care. Hearing back from our patients is one way we can continue to improve our services. Please take a few minutes to complete the written survey that you may receive in the mail after you visit. If you would like to speak to someone directly about your visit please contact Patient Relations at [phone]. Thank you!          Patient Information     Date Of Birth          1985        Designated Caregiver       Most Recent Value    Caregiver    Will someone help with your care after discharge? no      About your hospital stay     You were admitted on:  May 22, 2018 You last received care in the:  [facility]    You were discharged on:  May 23, 2018        Reason for your hospital stay       You were admitted for abdominal discomfort, nausea vomiting and bleeding per rectum.  Your treated with IV fluids and seen by a GI specialist.  Thankfully your upper endoscopy did not show any apparent significant abnormality and your colonoscopy showed diverticuli (which often cause painless bleeding that typically resolves on its own) but no evidence of infection in the colon or inflammatory bowel disease was apparent.  As per the GI specialist they suspect that a small bowel infection/gastroenteritis was causing some of your symptoms.    At this point the plan is for you to maintain good hydration and nutrition at home and recover there.  No further specific follow-up is needed unless her symptoms do not resolve or worsen.                  Who to Call     For medical emergencies, please call 911.  For non-urgent questions about your medical care, please call your primary care provider or clinic, [phone]  For questions related to your surgery,  please call your surgery clinic        Attending Provider     Provider Specialty    Carlos High MD Emergency Medicine    Cody Wright MD Internal Medicine       Primary Care Provider Office Phone # Fax #    Andrea Leach [phone]      After Care Instructions     Activity       Your activity upon discharge: Gradually resume usual activity as tolerated            Diet       Follow this diet upon discharge: Advance her diet slowly as tolerated.  Try to maintain good hydration by mouth.                  Follow-up Appointments     Follow-up and recommended labs and tests        Follow-up as needed with her primary care doctor, particularly if symptoms do not resolve.                  Further instructions from your care team         Understanding Diverticulosis and Diverticulitis     Pouches or diverticula usually occur in the lower part of the colon called the sigmoid.      Diverticulitis occurs when the pouches become inflamed.     The colon (large intestine) is the last part of the digestive tract. It absorbs water from stool and changes it from a liquid to a solid. In certain cases, small pouches called diverticula can form in the colon wall. This condition is called diverticulosis. The pouches can become infected. If this happens, it becomes a more serious problem called diverticulitis. These problems can be painful. But they can be managed.   Managing Your Condition  Diet changes or taking medications are often tried first. These may be enough to bring relief. If the case is bad, surgery may be done. You and your doctor can discuss the plan that is best for you.  If You Have Diverticulosis  Diet changes are often enough to control symptoms. The main changes are adding fiber (roughage) and drinking more water. Fiber absorbs water as it travels through your colon. This helps your stool stay soft and move smoothly. Water helps this process. If needed, you may be told to take  "over-the-counter stool softeners. To help relieve pain, antispasmodic medications may be prescribed.  If You Have Diverticulitis  Treatment depends on how bad your symptoms are.  For mild symptoms: You may be put on a liquid diet for a short time. You may also be prescribed antibiotics. If these two steps relieve your symptoms, you may then be prescribed a high-fiber diet. If you still have symptoms, your doctor will discuss further treatment options with you.  For severe symptoms: You may need to be admitted to the hospital. There, you can be given IV antibiotics and fluids. Once symptoms are under control, the above treatments may be tried. If these don t control your condition, your doctor may discuss the option of having surgery with you.  [place] [facility]  Help keep your colon healthy with a diet that includes plenty of high-fiber fruits, vegetables, and whole grains. Drink plenty of liquids like water and juice. Your doctor may also recommend avoiding seeds and nuts.          [id number] [address], [place] [address]. All rights reserved. This information is not intended as a substitute for professional medical care. Always follow your healthcare professional's instructions.    Pending Results     No orders found from 5/19/2018 to 5/22/2018.            Statement of Approval     Ordered          05/23/18 1425  I have reviewed and agree with all the recommendations and orders detailed in this document.  EFFECTIVE NOW     Approved and electronically signed by:  Daniel Call MD             Admission Information     Date & Time Provider Department Dept. Phone    5/21/2018 Cody Wright MD [facility] [phone]      Your Vitals Were     Blood Pressure Pulse Temperature Respirations Height Weight    125/72 63 98.3  F (36.8  C) (Oral) 16 1.575 m (5' 2\") 97.7 kg (215 lb 6.2 oz)    Last Period Pulse Oximetry BMI (Body Mass Index)             " 11/01/2017 99% 39.4 kg/m2         IP Street Information     [facility] gives you secure access to your electronic health record. If you see a primary care provider, you can also send messages to your care team and make appointments. If you have questions, please call your primary care clinic.  If you do not have a primary care provider, please call [phone] and they will assist you.        Care EveryWhere ID     This is your Care EveryWhere ID. This could be used by other organizations to access your [place] medical records  [id number]        Equal Access to Services     [facility]: Hadii mary lares Sonalini, wabrynn garza, sridhar parkalarin saxena, davi genao . So [facility] [phone].    ATENCIÓN: Si habla español, tiene a castro disposición servicios gratuitos de asistencia lingüística. [facility] [phone].    We comply with applicable federal civil rights laws and [place] laws. We do not discriminate on the basis of race, color, national origin, age, disability, sex, sexual orientation, or gender identity.               Review of your medicines      CONTINUE these medicines which have NOT CHANGED        Dose / Directions    acetaminophen 325 MG tablet   Commonly known as:  TYLENOL        Dose:  325 mg   Take 325 mg by mouth every 6 hours as needed for mild pain   Refills:  0       BIOTIN PO        Take by mouth daily   Refills:  0       CALCIUM 600 PO        Dose:  600 mg   Take 600 mg by mouth 2 times daily   Refills:  0       ergocalciferol 20334 units capsule   Commonly known as:  ERGOCALCIFEROL   Notes to Patient:  Resume as previously instructed        Dose:  36982 Units   Take 50,000 Units by mouth once a week   Refills:  0       liraglutide 18 MG/3ML soln   Commonly known as:  VICTOZA        Dose:  0.6 mg   Inject 0.6 mg Subcutaneous At Bedtime   Refills:  0       metFORMIN 500 MG 24 hr tablet   Commonly known as:  GLUCOPHAGE-XR        Dose:  1000 mg   Take 1,000 mg  by mouth 2 times daily (with meals)   Refills:  0       mometasone-formoterol 200-5 MCG/ACT oral inhaler   Commonly known as:  DULERA        Dose:  2 puff   Inhale 2 puffs into the lungs 2 times daily   Refills:  0       TOPIRAMATE PO        Dose:  25 mg   Take 25 mg by mouth 2 times daily   Refills:  0                Protect others around you: Learn how to safely use, store and throw away your medicines at www.disposemymeds.org.             Medication List: This is a list of all your medications and when to take them. Check marks below indicate your daily home schedule. Keep this list as a reference.      Medications           Morning Afternoon Evening Bedtime As Needed    acetaminophen 325 MG tablet   Commonly known as:  TYLENOL   Take 325 mg by mouth every 6 hours as needed for mild pain   Last time this was given:  650 mg on 5/23/2018  2:10 PM                                   BIOTIN PO   Take by mouth daily                                   CALCIUM 600 PO   Take 600 mg by mouth 2 times daily                                      ergocalciferol 61431 units capsule   Commonly known as:  ERGOCALCIFEROL   Take 50,000 Units by mouth once a week   Notes to Patient:  Resume as previously instructed                                liraglutide 18 MG/3ML soln   Commonly known as:  VICTOZA   Inject 0.6 mg Subcutaneous At Bedtime                                   metFORMIN 500 MG 24 hr tablet   Commonly known as:  GLUCOPHAGE-XR   Take 1,000 mg by mouth 2 times daily (with meals)                                      mometasone-formoterol 200-5 MCG/ACT oral inhaler   Commonly known as:  DULERA   Inhale 2 puffs into the lungs 2 times daily                                      TOPIRAMATE PO   Take 25 mg by mouth 2 times daily                                                More Information        Upper GI Endoscopy     During endoscopy, a long, flexible tube is used to view the inside of your upper GI tract.      Upper GI  endoscopy allows your healthcare provider to look directly into the beginning of your gastrointestinal (GI) tract. The esophagus, stomach, and duodenum (the first part of the small intestine) make up the upper GI tract.   Before the exam  Follow these and any other instructions you are given before your endoscopy. If you don t follow the healthcare provider s instructions carefully, the test may need to be canceled or done over:    Don't eat or drink anything after midnight the night before your exam. If your exam is in the afternoon, drink only clear liquids in the morning. Don't eat or drink anything for 8 hours before the exam. In some cases, you may be able to take medicines with sips of water until 2 hours before the procedure. Speak with your healthcare provider about this.     Bring your X-rays and any other test results you have.    Because you will be sedated, arrange for an adult to drive you home after the exam.    Tell your healthcare provider before the exam if you are taking any medicines or have any medical problems.  The procedure  Here is what to expect:    You will lie on the endoscopy table. Usually patients lie on the left side.    You will be monitored and given oxygen.    Your throat may be numbed with a spray or gargle. You are given medicine through an intravenous (IV) line that will help you relax and remain comfortable. You may be awake or asleep during the procedure.    The healthcare provider will put the endoscope in your mouth and down your esophagus. It is thinner than most pieces of food that you swallow. It will not affect your breathing. The medicine helps keep you from gagging.    Air is put into your GI tract to expand it. It can make you burp.    During the procedure, the healthcare provider can take biopsies (tissue samples), remove abnormalities, such as polyps, or treat abnormalities through a variety of devices placed through the endoscope. You will not feel this.     The  endoscope carries images of your upper GI tract to a video screen. If you are awake, you may be able to look at the images.    After the procedure is done, you will rest for a time. An adult must drive you home.  When to call your healthcare provider  Contact your healthcare provider if you have:    Black or tarry stools, or blood in your stool    Fever    Pain in your belly that does not go away    Nausea and vomiting, or vomiting blood   Date Last Reviewed: 7/1/2016 2000-2017 [employer]. [address] [place] [address]. All rights reserved. This information is not intended as a substitute for professional medical care. Always follow your healthcare professional's instructions.                Colonoscopy     A camera attached to a flexible tube with a viewing lens is used to take video pictures.     Colonoscopy is a test to view the inside of your lower digestive tract (colon and rectum). Sometimes it can show the last part of the small intestine (ileum). During the test, small pieces of tissue may be removed for testing. This is called a biopsy. Small growths, such as polyps, may also be removed.   Why is colonoscopy done?  The test is done to help look for colon cancer. And it can help find the source of abdominal pain, bleeding, and changes in bowel habits. It may be needed once a year, depending on factors such as your:    Age    Health history    Family health history    Symptoms    Results from any prior colonoscopy  Risks and possible complications  These include:    Bleeding                 A puncture or tear in the colon     Risks of anesthesia    A cancer lesion not being seen  Getting ready   To prepare for the test:    Talk with your healthcare provider about the risks of the test (see below). Also ask your healthcare provider about alternatives to the test.    Tell your healthcare provider about any medicines you take. Also tell him or her about any health conditions you may  have.    Make sure your rectum and colon are empty for the test. Follow the diet and bowel prep instructions exactly. If you don t, the test may need to be rescheduled.    Plan for a friend or family member to drive you home after the test.     Colonoscopy provides an inside view of the entire colon.     You may discuss the results with your doctor right away or at a future visit.  During the test   The test is usually done in the hospital on an outpatient basis. This means you go home the same day. The procedure takes about 30 minutes. During that time:    You are given relaxing (sedating) medicine through an IV line. You may be drowsy, or fully asleep.    The healthcare provider will first give you a physical exam to check for anal and rectal problems.    Then the anus is lubricated and the scope inserted.    If you are awake, you may have a feeling similar to needing to have a bowel movement. You may also feel pressure as air is pumped into the colon. It s OK to pass gas during the procedure.    Biopsy, polyp removal, or other treatments may be done during the test.  After the test   You may have gas right after the test. It can help to try to pass it to help prevent later bloating. Your healthcare provider may discuss the results with you right away. Or you may need to schedule a follow-up visit to talk about the results. After the test, you can go back to your normal eating and other activities. You may be tired from the sedation and need to rest for a few hours.  Date Last Reviewed: 11/1/2016 2000-2017 [employer]. [address] [place] [address], [place] [address]. All rights reserved. This information is not intended as a substitute for professional medical care. Always follow your healthcare professional's instructions.

## 2018-05-21 NOTE — IP AVS SNAPSHOT
[facility]    [address]    [facility] [address]    Phone:  [phone]    Fax:  [phone]                                       After Visit Summary   5/21/2018    Blossom Hale    MRN: [id number]           After Visit Summary Signature Page     I have received my discharge instructions, and my questions have been answered. I have discussed any challenges I see with this plan with the nurse or doctor.    ..........................................................................................................................................  Patient/Patient Representative Signature      ..........................................................................................................................................  Patient Representative Print Name and Relationship to Patient    ..................................................               ................................................  Date                                            Time    ..........................................................................................................................................  Reviewed by Signature/Title    ...................................................              ..............................................  Date                                                            Time

## 2018-05-22 PROBLEM — K92.2 GI BLEED: Status: ACTIVE | Noted: 2018-05-22

## 2018-05-22 LAB
C COLI+JEJUNI+LARI FUSA STL QL NAA+PROBE: NOT DETECTED
C DIFF TOX B STL QL: NEGATIVE
EC STX1 GENE STL QL NAA+PROBE: NOT DETECTED
EC STX2 GENE STL QL NAA+PROBE: NOT DETECTED
ENTERIC PATHOGEN COMMENT: NORMAL
GLUCOSE BLDC GLUCOMTR-MCNC: 82 MG/DL (ref 70–99)
GLUCOSE BLDC GLUCOMTR-MCNC: 87 MG/DL (ref 70–99)
GLUCOSE SERPL-MCNC: 85 MG/DL (ref 70–99)
HGB BLD-MCNC: 11.8 G/DL (ref 11.7–15.7)
HGB BLD-MCNC: 11.8 G/DL (ref 11.7–15.7)
NOROV GI+II ORF1-ORF2 JNC STL QL NAA+PR: NOT DETECTED
RVA NSP5 STL QL NAA+PROBE: NOT DETECTED
SALMONELLA SP RPOD STL QL NAA+PROBE: NOT DETECTED
SHIGELLA SP+EIEC IPAH STL QL NAA+PROBE: NOT DETECTED
SPECIMEN SOURCE: NORMAL
V CHOL+PARA RFBL+TRKH+TNAA STL QL NAA+PR: NOT DETECTED
Y ENTERO RECN STL QL NAA+PROBE: NOT DETECTED

## 2018-05-22 PROCEDURE — 00000146 ZZHCL STATISTIC GLUCOSE BY METER IP

## 2018-05-22 PROCEDURE — 87493 C DIFF AMPLIFIED PROBE: CPT | Performed by: PHYSICIAN ASSISTANT

## 2018-05-22 PROCEDURE — 25000128 H RX IP 250 OP 636: Performed by: INTERNAL MEDICINE

## 2018-05-22 PROCEDURE — 82947 ASSAY GLUCOSE BLOOD QUANT: CPT | Performed by: INTERNAL MEDICINE

## 2018-05-22 PROCEDURE — 96376 TX/PRO/DX INJ SAME DRUG ADON: CPT

## 2018-05-22 PROCEDURE — 85018 HEMOGLOBIN: CPT | Performed by: INTERNAL MEDICINE

## 2018-05-22 PROCEDURE — G0378 HOSPITAL OBSERVATION PER HR: HCPCS

## 2018-05-22 PROCEDURE — 99219 ZZC INITIAL OBSERVATION CARE,LEVL II: CPT | Performed by: INTERNAL MEDICINE

## 2018-05-22 PROCEDURE — 36415 COLL VENOUS BLD VENIPUNCTURE: CPT | Performed by: INTERNAL MEDICINE

## 2018-05-22 PROCEDURE — 87506 IADNA-DNA/RNA PROBE TQ 6-11: CPT | Performed by: PHYSICIAN ASSISTANT

## 2018-05-22 PROCEDURE — 25000132 ZZH RX MED GY IP 250 OP 250 PS 637: Performed by: PHYSICIAN ASSISTANT

## 2018-05-22 RX ORDER — MAGNESIUM CARB/ALUMINUM HYDROX 105-160MG
296 TABLET,CHEWABLE ORAL ONCE
Status: COMPLETED | OUTPATIENT
Start: 2018-05-23 | End: 2018-05-23

## 2018-05-22 RX ORDER — ONDANSETRON 2 MG/ML
4 INJECTION INTRAMUSCULAR; INTRAVENOUS EVERY 6 HOURS PRN
Status: DISCONTINUED | OUTPATIENT
Start: 2018-05-22 | End: 2018-05-23 | Stop reason: HOSPADM

## 2018-05-22 RX ORDER — POLYETHYLENE GLYCOL 3350 17 G/17G
238 POWDER, FOR SOLUTION ORAL ONCE
Status: COMPLETED | OUTPATIENT
Start: 2018-05-22 | End: 2018-05-22

## 2018-05-22 RX ORDER — NALOXONE HYDROCHLORIDE 0.4 MG/ML
.1-.4 INJECTION, SOLUTION INTRAMUSCULAR; INTRAVENOUS; SUBCUTANEOUS
Status: DISCONTINUED | OUTPATIENT
Start: 2018-05-22 | End: 2018-05-23 | Stop reason: HOSPADM

## 2018-05-22 RX ORDER — NICOTINE POLACRILEX 4 MG
15-30 LOZENGE BUCCAL
Status: DISCONTINUED | OUTPATIENT
Start: 2018-05-22 | End: 2018-05-23 | Stop reason: HOSPADM

## 2018-05-22 RX ORDER — ONDANSETRON 4 MG/1
4 TABLET, ORALLY DISINTEGRATING ORAL EVERY 6 HOURS PRN
Status: DISCONTINUED | OUTPATIENT
Start: 2018-05-22 | End: 2018-05-23 | Stop reason: HOSPADM

## 2018-05-22 RX ORDER — PANTOPRAZOLE SODIUM 40 MG/1
40 TABLET, DELAYED RELEASE ORAL EVERY MORNING
Status: DISCONTINUED | OUTPATIENT
Start: 2018-05-22 | End: 2018-05-23 | Stop reason: HOSPADM

## 2018-05-22 RX ORDER — ALBUTEROL SULFATE 90 UG/1
1-2 AEROSOL, METERED RESPIRATORY (INHALATION) EVERY 4 HOURS PRN
Status: DISCONTINUED | OUTPATIENT
Start: 2018-05-22 | End: 2018-05-23 | Stop reason: HOSPADM

## 2018-05-22 RX ORDER — EPINEPHRINE 0.3 MG/.3ML
0.3 INJECTION SUBCUTANEOUS
Status: DISCONTINUED | OUTPATIENT
Start: 2018-05-22 | End: 2018-05-22 | Stop reason: CLARIF

## 2018-05-22 RX ORDER — DEXTROSE MONOHYDRATE 25 G/50ML
25-50 INJECTION, SOLUTION INTRAVENOUS
Status: DISCONTINUED | OUTPATIENT
Start: 2018-05-22 | End: 2018-05-23 | Stop reason: HOSPADM

## 2018-05-22 RX ORDER — ACETAMINOPHEN 325 MG/1
650 TABLET ORAL EVERY 4 HOURS PRN
Status: DISCONTINUED | OUTPATIENT
Start: 2018-05-22 | End: 2018-05-23 | Stop reason: HOSPADM

## 2018-05-22 RX ORDER — SODIUM CHLORIDE, SODIUM LACTATE, POTASSIUM CHLORIDE, CALCIUM CHLORIDE 600; 310; 30; 20 MG/100ML; MG/100ML; MG/100ML; MG/100ML
INJECTION, SOLUTION INTRAVENOUS CONTINUOUS
Status: DISCONTINUED | OUTPATIENT
Start: 2018-05-22 | End: 2018-05-23 | Stop reason: HOSPADM

## 2018-05-22 RX ADMIN — ONDANSETRON 4 MG: 2 SOLUTION INTRAMUSCULAR; INTRAVENOUS at 07:49

## 2018-05-22 RX ADMIN — SODIUM CHLORIDE, POTASSIUM CHLORIDE, SODIUM LACTATE AND CALCIUM CHLORIDE: 600; 310; 30; 20 INJECTION, SOLUTION INTRAVENOUS at 22:12

## 2018-05-22 RX ADMIN — Medication 238 G: at 17:04

## 2018-05-22 RX ADMIN — SODIUM CHLORIDE, POTASSIUM CHLORIDE, SODIUM LACTATE AND CALCIUM CHLORIDE: 600; 310; 30; 20 INJECTION, SOLUTION INTRAVENOUS at 03:43

## 2018-05-22 RX ADMIN — SODIUM CHLORIDE, POTASSIUM CHLORIDE, SODIUM LACTATE AND CALCIUM CHLORIDE: 600; 310; 30; 20 INJECTION, SOLUTION INTRAVENOUS at 12:03

## 2018-05-22 RX ADMIN — PANTOPRAZOLE SODIUM 40 MG: 40 TABLET, DELAYED RELEASE ORAL at 11:55

## 2018-05-22 NOTE — PROGRESS NOTES
Update:    Patient seen in follow-up, I agree with Leeroy Wright MD's plan of care from admission earlier this morning.  Blossom continues to have some bilateral lower abdominal pain and is admitted here for suspected lower GI bleeding.  She was seen by GI earlier today and a note plan for colonoscopy tomorrow with prep tonight.  Discussing her history it sounds like her mother does have Crohn's disease and it seems reasonable to evaluate for Crohn's with endoscopy.  She adamantly denies history of type 2 diabetes and states she is on both metformin and Victoza for polycystic ovary syndrome.  Blood sugars have been normal here and she does request we stop blood sugar checks.    Daniel Call MD

## 2018-05-22 NOTE — TELEPHONE ENCOUNTER
"Reason for call: Blossom calling concerned about her rectal bleeding. Reports \"I'm having rectal bleeding, it started 1 week ago, it's light and spotting, the more I work the more I bleed, I have low back pain and also throwing up with it, it is a little scary, and I need advice.\" Reports \"this weekend on my day off, I felt like I was going to pass out, I thought I was dehydrated so I drank water and rested, I've been under a lot of stress lately and have anxiety.\" Medical hx includes: PCOS, asthma, Victoza, metformin, chronic diarrhea, and anxiety.  Symptoms: rectal bleeding (bleeding without BMs, bright red), low back pain, vomiting (blood in vomit today, has vomited about 4 times in past week), abdominal pain, unknown if fever, diarrhea (last 24 hours had 4 BMs, loose stools and oily), dropped 11 lbs in 2 weeks, weak in legs, leg cramps, anxiety  Symptoms started 1 week ago.   Pain? yes  Location: low back- dull & abdominal (lower middle and radiates out) - dull to sharp    Rated: \"low back rated 4/10 and stomach rated 5/10, constant but worsens\"   Emergent symptoms reviewed. Care advice given per triage guideline; advised caller to be seen in ER now and have another adult drive. Caller verbalized understanding of care advice given and plans to go to [facility] ER. Caller had no further questions. Encouraged call back to [facility] 24/7 for nurse line services, new/worsening symptoms or further questions.    Lizet Joy RN  [facility]  Reason for Disposition    [1] MODERATE rectal bleeding (small blood clots, passing blood without stool, or toilet water turns red) AND [2] more than once a day     Rectal bleeding x 1 week    Additional Information    Negative: Shock suspected (e.g., cold/pale/clammy skin, too weak to stand, low BP, rapid pulse)    Negative: Difficult to awaken or acting confused  (e.g., disoriented, slurred speech)    Negative: Passed out (i.e., lost consciousness, collapsed and was not " "responding)    Negative: [1] Vomiting AND [2] contains red blood or black (\"coffee ground\") material  (Exception: few red streaks in vomit that only happened once)    Negative: Sounds like a life-threatening emergency to the triager    Negative: Diarrhea is main symptom    Negative: Stool color other than brown or tan is main concern  (no bleeding and no melena)    Negative: SEVERE rectal bleeding (large blood clots; on and off, or constant bleeding)    Negative: SEVERE dizziness (e.g., unable to stand, requires support to walk, feels like passing out now)    Answer Assessment - Initial Assessment Questions  1. APPEARANCE of BLOOD: \"What color is it?\" \"Is it passed separately, on the surface of the stool, or mixed in with the stool?\"       Bright red  2. AMOUNT: \"How much blood was passed?\"       moderate  3. FREQUENCY: \"How many times has blood been passed with the stools?\"       > 4   4. ONSET: \"When was the blood first seen in the stools?\" (Days or weeks)       1 week ago  5. DIARRHEA: \"Is there also some diarrhea?\" If so, ask: \"How many diarrhea stools were passed in past 24 hours?\"       Yes, 4  6. CONSTIPATION: \"Do you have constipation?\" If so, \"How bad is it?\"      denies  7. RECURRENT SYMPTOMS: \"Have you had blood in your stools before?\" If so, ask: \"When was the last time?\" and \"What happened that time?\"       denies  8. BLOOD THINNERS: \"Do you take any blood thinners?\" (e.g., Coumadin/warfarin, Pradaxa/dabigatran, aspirin)      denies  9. OTHER SYMPTOMS: \"Do you have any other symptoms?\"  (e.g., abdominal pain, vomiting, dizziness, fever)      Abdominal pain, low back pain, vomiting  10. PREGNANCY: \"Is there any chance you are pregnant?\" \"When was your last menstrual period?\"        Didn't report    Protocols used: RECTAL BLEEDING-ADULT-AH    "

## 2018-05-22 NOTE — CONSULTS
GASTROENTEROLOGY CONSULTATION      Blossom Hale  [address]  [place] [address]  32 year old female     Admission Date/Time: 5/21/2018  Primary Care Provider: Andrea Leach  Referring / Attending Physician:  Dr. Wright     We were asked to see the patient in consultation by Dr. Wright for evaluation of bloody diarrhea.        HPI:  Blossom Hale is a 32 year old female with history of PCOS, depression, renal stones, asthma and s/p cholecystectomy who presents with one week of bloody diarrhea. She reports she having 4-5 loose stools a day described as brown mixed with red blood. She became nauseous about 5 days ago and has been intermittently vomiting. The emesis was non-bloody until one episode of small volume hematemesis yesterday. She has associated lower abdominal pain for the last week described as a constant dull ache that becomes sharp/shooting pain immediately before a bowel movement and improves after a bowel movement. She has had poor appetite since this started. She was taking daily ibuprofen up until 1 week ago and has now stopped.    She reports baseline bowel habits of about 2 stools per day with the first stool often loose. She has attributed this to her Metformin use. She denies having blood in her stools before this acute episode started a week ago.     Colonoscopy in 2009 was normal including TI eval and random colon biopsies.    Family history significant for mom with Crohn's disease.     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     Priority: Medium     Contraception 12/05/2011     Priority: Medium     Abnormal pelvic ultrasound 12/05/2011     Priority: Medium     Mild major depression (H) 12/05/2011     Priority: Medium     CARDIOVASCULAR SCREENING; LDL GOAL LESS THAN 160 10/31/2010     Priority: Medium     Shoulder pain 05/20/2010     Priority: Medium     Moderate major depression (H) 11/19/2009     Priority: Medium     Elevated C-reactive protein (CRP) 10/06/2009     Priority: Medium     Obesity 10/06/2009     Priority: Medium     Menorrhagia 07/13/2009     Priority: Medium     Allergic rhinitis 07/13/2009     Priority: Medium     Problem list name updated by automated process. 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       MEDICATIONS:   Prior to Admission medications    Medication Sig Start Date End Date Taking? Authorizing Provider   acetaminophen (TYLENOL) 325 MG tablet Take 325 mg by mouth every 6 hours as needed for mild pain   Yes Reported, Patient   Biotin 10 MG TABS tablet    Yes Reported, Patient   Calcium Carbonate (CALCIUM 600 PO) Take by mouth 2 times daily   Yes Reported, Patient   ergocalciferol (ERGOCALCIFEROL) 51291 UNITS capsule Take 50,000 Units by mouth once a week   Yes Reported, Patient   liraglutide (VICTOZA) 18 MG/3ML soln Inject Subcutaneous daily   Yes Reported, Patient   metFORMIN (GLUCOPHAGE-XR) 500 MG 24 hr tablet Take 500 mg by mouth Take 2 tablets in AM and 2 tablets in PM   Yes Reported, Patient   mometasone-formoterol (DULERA) 200-5 MCG/ACT oral inhaler Inhale 2 puffs into the lungs 2 times daily   Yes Reported, Patient   EPINEPHrine (EPIPEN) 0.3 MG/0.3ML injection Inject 0.3 mLs (0.3 mg) into the muscle once as needed for anaphylaxis 1/15/15   Sundar Abdullahi, WARD CNP  "  HYDROcodone-acetaminophen (NORCO) 5-325 MG per tablet Take 1-2 tablets by mouth every 4 hours as needed for moderate to severe pain 10/23/17   Carlos Velez MD   PROAIR  (90 BASE) MCG/ACT IN AERS INHALE 1 TO 2 PUFFS EVERY 4 TO 6 HOURS AS NEEDED 09   Laura Jorge PA-C        ALLERGIES:   Allergies   Allergen Reactions     Dust Mite Extract      Latex      Levaquin Swelling     Morphine Hives        SOCIAL HISTORY:  Social History   Substance Use Topics     Smoking status: Former Smoker     Types: Cigarettes     Smokeless tobacco: Never Used      Comment: quit in      Alcohol use 0.0 oz/week     0 Standard drinks or equivalent per week      Comment: few times yearly   Drinks a glass of wine about every other week.      FAMILY HISTORY:  Family History   Problem Relation Age of Onset     GASTROINTESTINAL DISEASE Mother      Crohns Disease     Myocardial Infarction Maternal Grandfather 70     2 times -? age, at ag eof 77     Cardiovascular Paternal Grandfather      chf      DIABETES Paternal Grandfather      Neurologic Disorder Paternal Grandfather      amyloid tumor     Cardiovascular Maternal Grandmother      chf      CANCER Father      Lung Cancer,smoker      Family History Negative Sister      1     Family History Negative Brother      1     Breast Cancer Other      cousins, mother's side of family -dx in 30     Cancer - colorectal No family hx of         PHYSICAL EXAM:     /73  Pulse 83  Temp 98.3  F (36.8  C) (Oral)  Resp 16  Ht 1.575 m (5' 2\")  Wt 97.7 kg (215 lb 6.2 oz)  LMP 2017  SpO2 96%  BMI 39.4 kg/m2     PHYSICAL EXAM:  GENERAL: No acute distress  SKIN: no suspicious lesions, rashes, jaundice, or spider angiomas  HEAD: Normocephalic. Atraumatic.  NECK: Neck supple. No adenopathy.   EYES: No scleral icterus  RESPIRATORY: Good transmission. CTA bilaterally.   CARDIOVASCULAR: RRR, normal S1, S2,  No murmur appreciated  GASTROINTESTINAL: +BS, soft, tender " diffusely across lower abdomen but greatest in RLQ, non distended, no hepatosplenomegaly, no masses/guarding/rebound  JOINT/EXTREMITIES:  no gross deformities noted, normal muscle tone  NEURO: CN 2-12 grossly intact, no focal deficits  PSYCH: Normal affect              ADDITIONAL COMMENTS:   I reviewed the patient's new clinical lab test results.   Recent Labs   Lab Test  05/22/18   0655  05/21/18   2204  10/23/17   1113  12/14/12   0954   WBC   --   9.7  6.7  9.1   HGB  11.8  13.0  12.6  12.4   MCV   --   90  88  88   PLT   --   256  225  265   INR   --   0.99   --    --      Recent Labs   Lab Test  05/21/18 2204  10/23/17   1113  12/14/12   0954   POTASSIUM  4.1  3.7  4.0   CHLORIDE  108  108  104   CO2  24  24  25   BUN  13  8  10   ANIONGAP  8  7  10     Recent Labs   Lab Test  05/21/18   2303  05/21/18   2204  10/23/17   1012  12/14/12   0954  12/12/11   0854  05/28/11   1310   ALBUMIN   --   4.0   --   3.6*  3.7*   --    BILITOTAL   --   0.3   --   0.5  0.6   --    ALT   --   108*   --   30  10   --    AST   --   59*   --   24  21   --    PROTEIN  Negative   --   Negative   --    --   10*   LIPASE   --   261   --    --    --    --         IMAGING / ENDOSCOPY   CT ABDOMEN PELVIS W CONTRAST  5/21/2018 11:41 PM      HISTORY: Lower abdominal pain, back pain, hematochezia.     TECHNIQUE: CT abdomen and pelvis with 100 mL Isovue-370 IV. Radiation  dose for this scan was reduced using automated exposure control,  adjustment of the mA and/or kV according to patient size, or iterative  reconstruction technique.     COMPARISON: 10/23/2017.     FINDINGS:  Abdomen: There is minimal dependent atelectasis at the lung bases. The  heart size is normal. The liver, spleen, pancreas, adrenal glands and  kidneys are normal in appearance. The gallbladder is absent. There is  no abdominal or pelvic lymph node enlargement.     Pelvis: There is an IUD in place. No adnexal mass. There are colonic  diverticula without acute  diverticulitis. There is no bowel  obstruction or inflammation. The appendix is normal. Small bowel is  within normal limits. No free intraperitoneal gas or fluid. A few  pelvic phleboliths.         IMPRESSION:  1. No acute abnormality. No bowel obstruction or inflammation.  2. Colonic diverticula without acute diverticulitis.  3. IUD in place.       CONSULTATION ASSESSMENT AND PLAN:    Blossom Hale is a 32 year old admitted with one week of bloody diarrhea, nausea, vomiting with one episode of hematemesis, and lower abdominal pain. CT with diverticulosis but otherwise unremarkable.      1. Bloody diarrhea- Differential includes infectious vs inflammatory. Family history is positive for mother with Crohn's. Less likely upper GI bleeding given brown stool mixed with blood and Hg only slightly down with hydration. Diverticular bleeding is another consideration but would expect larger amount of blood with that.  -Clear liquid diet today  -Infectious stool studies  -Monitor H/H  -Colonoscopy with TI evaluation tomorrow    2. Nausea, vomiting, hematemesis- Hematemesis may be from Gabriela hurtado tear or gastritis from NSAID use.  -Start PPI and as needed anti-emetics for nausea  -EGD tomorrow    3. Elevated LFTs, normal liver on CT scan  -Repeat labs in AM    I discussed the patient plan with Dr. Faria. Thank you for asking us to participate in the care of this patient.    Katey Cervantes PA-C  [facility]

## 2018-05-22 NOTE — PHARMACY-ADMISSION MEDICATION HISTORY
Admission medication history interview status for this patient is complete. See [facility] admission navigator for allergy information, prior to admission medications and immunization status.     Medication history interview source(s):PatientPatient  Medication history resources (including written lists, pill bottles, clinic record):None  Primary pharmacy:[facility]    Changes made to PTA medication list:  Added: topiramate  Deleted: proair, epipen, norco  Changed: victoza, metformin, calcium    Actions taken by pharmacist (provider contacted, etc):None     Additional medication history information:None    Medication reconciliation/reorder completed by provider prior to medication history? No    Do you take OTC medications (eg tylenol, ibuprofen, fish oil, eye/ear drops, etc)? Y(Y/N)    For patients on insulin therapy: N (Y/N)  Lantus/levemir/NPH/Mix 70/30 dose:   (Y/N) (see Med list for doses)   Sliding scale Novolog Y/N  If Yes, do you have a baseline novolog pre-meal dose:  units with meals  Patients eat three meals a day:   Y/N    How many episodes of hypoglycemia do you have per week: _______  How many missed doses do you have per week: ______  How many times do you check your blood glucose per day: _______   Any Barriers to therapy - Be specific :  cost of medications, comfortable with giving injections (if applicable), comfortable and confident with current diabetes regimen: Y/N ______________      Prior to Admission medications    Medication Sig Last Dose Taking? Auth Provider   acetaminophen (TYLENOL) 325 MG tablet Take 325 mg by mouth every 6 hours as needed for mild pain  Yes Reported, Patient   BIOTIN PO Take by mouth daily 5/21/2018 at Unknown time Yes Unknown, Entered By History   Calcium Carbonate (CALCIUM 600 PO) Take 600 mg by mouth 2 times daily  5/21/2018 at Unknown time Yes Reported, Patient   ergocalciferol (ERGOCALCIFEROL) 52528 UNITS capsule Take 50,000 Units by mouth once a week 5/16/2018 Yes  Reported, Patient   liraglutide (VICTOZA) 18 MG/3ML soln Inject 0.6 mg Subcutaneous At Bedtime  5/20/2018 at Unknown time Yes Reported, Patient   metFORMIN (GLUCOPHAGE-XR) 500 MG 24 hr tablet Take 1,000 mg by mouth 2 times daily (with meals)  5/21/2018 at Unknown time Yes Reported, Patient   mometasone-formoterol (DULERA) 200-5 MCG/ACT oral inhaler Inhale 2 puffs into the lungs 2 times daily 5/21/2018 at Unknown time Yes Reported, Patient   TOPIRAMATE PO Take 25 mg by mouth 2 times daily 5/21/2018 at Unknown time Yes Unknown, Entered By History

## 2018-05-22 NOTE — PLAN OF CARE
Problem: Gastrointestinal Bleeding (Adult)  Goal: Signs and Symptoms of Listed Potential Problems Will be Absent, Minimized or Managed (Gastrointestinal Bleeding)  Signs and symptoms of listed potential problems will be absent, minimized or managed by discharge/transition of care (reference Gastrointestinal Bleeding (Adult) CPG).   Outcome: No Change  R.N.  VSS, afebrile, states that she is having small amount of rectal bleeding, nurse did not observe any bleeding, color pale, kept NPO X for ice chips, GI to consult today, c/o wave of nausea, no emesis, was too early for zofran and nausea passed, 0338 onetouch was 82, glucose ordered for 0600 with Hgb, will continue to monitor closely and provide for needs

## 2018-05-22 NOTE — CONSULTS
"CLINICAL NUTRITION SERVICES  -  ASSESSMENT NOTE    Recommendations Ordered by Registered Dietitian (RD):   - will assess education/high protein supplement needs once POC developed and pt tolerating diet >CLD.    Malnutrition:   % Weight Loss:  > 2% in 1 week (severe malnutrition)  % Intake:  </= 50% for >/= 5 days (severe malnutrition)  Subcutaneous Fat Loss:  None observed  Muscle Loss:  None observed  Fluid Retention:  None noted    Malnutrition Diagnosis: Severe malnutrition  In Context of:  Acute illness or injury     REASON FOR ASSESSMENT  Blossom Hale is a 32 year old female seen by Registered Dietitian for Admission Nutrition Risk Screen - unintentional loss of 10 lbs or more in the past 2 mos.     NUTRITION HISTORY  - Information obtained from patient, EMR.   - Admits w/ 1-2 week hx ongoing vomiting, diarrhea, with bloody emesis and GIB yesterday morning.   - family hx Crohn's disease  - Pt denies h/o Type2 DM    - Pt follows a regular diet --> sometimes limits gluten, does not drink milk or eat ice cream. Tolerates frozen yogurt.   - Latex allergy  - Diet recall   Breakfast: Granola/Nutrigrain bar   Lunch: Brings from home, PB sandwich or similar. Something easy to \"grab and go\"   Dinner: Pasta, shrimp, stir sabillon. Eats what her daughter likes a lot of the time because she can be a picky eater. Easiest to just make one meal for the family.    Snacks: Chips, crackers, granola bars    - Noticed a large decline in appetite/PO tolerance in the past week. She has only been tolerating small amounts of bland foods as opposed to her usual diet.       CURRENT NUTRITION ORDERS  Diet Order:     Clear Liquid     Current Intake/Tolerance:  Tolerating CLD well.      PHYSICAL FINDINGS  Observed  No nutrition-related physical findings observed  Obtained from Chart/Interdisciplinary Team  EGD, Colonoscopy tomorrow     ANTHROPOMETRICS  Height: 5' 2\"  Weight: 215 lbs 6.23 oz (97.7 kg)  Body mass index is 39.4 " kg/(m^2).  Weight Status:  Obesity Grade II BMI 35-39.9  IBW: 50 kg  % IBW: 195%  Weight History: Pt reports an 11# loss in the past 2 weeks, #. (4.9%).   Wt Readings from Last 10 Encounters:   05/22/18 97.7 kg (215 lb 6.2 oz)   10/23/17 102.2 kg (225 lb 5 oz)   02/29/16 106 kg (233 lb 9.6 oz)   01/14/15 95.3 kg (210 lb)   12/06/12 111 kg (244 lb 11.2 oz)   11/05/12 110.7 kg (244 lb)   10/08/12 110.7 kg (244 lb)   12/05/11 105.7 kg (233 lb)   11/28/11 106.8 kg (235 lb 6.4 oz)   10/10/11 103.9 kg (229 lb)       LABS  Labs reviewed    Recent Labs  Lab 05/22/18  0755 05/22/18  0655 05/22/18  0338 05/21/18  2204   GLC  --  85  --  81   BGM 87  --  82  --    No results found for: A1C      MEDICATIONS  Medications reviewed    ASSESSED NUTRITION NEEDS PER APPROVED PRACTICE GUIDELINES:  Dosing Weight 61.9 kg  Estimated Energy Needs: 4157-1842 kcals (20-25 Kcal/Kg)  Justification: obese  Estimated Protein Needs:  grams protein (1.5-2 g pro/Kg)  Justification: preservation of lean body mass  Estimated Fluid Needs: 2645-2575 mL (1 mL/Kcal)  Justification: maintenance    MALNUTRITION:  % Weight Loss:  > 2% in 1 week (severe malnutrition)  % Intake:  </= 50% for >/= 5 days (severe malnutrition)  Subcutaneous Fat Loss:  None observed  Muscle Loss:  None observed  Fluid Retention:  None noted    Malnutrition Diagnosis: Severe malnutrition  In Context of:  Acute illness or injury    NUTRITION DIAGNOSIS:  Malnutrition related to altered GI function causing decreased PO tolerance w/ nausea, vomiting, diarrhea, bloody emesis and stools as evidenced by intakes <50% x1 week, weight loss of ~5% in 2 weeks, coding for severe malnutrition.       NUTRITION INTERVENTIONS  Recommendations / Nutrition Prescription  Diet per MD once appropriate       Implementation  Nutrition education: Not appropriate at this time due to patient condition --> will reevaluate ed needs once POC developed and pt tolerating diet >CLD      Nutrition  Goals  Diet to advance >CLD in the next 24-48 hours.       MONITORING AND EVALUATION:  Progress towards goals will be monitored and evaluated per protocol and Practice Guidelines        Flor Norton RD, LD  3rd floor/ICU: [phone]  All other floors: [phone]  Weekend/holiday: [phone]  Office: [phone]

## 2018-05-22 NOTE — ED TRIAGE NOTES
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 Low abdominal and low back pain this past week . Notes l;eg cramping also

## 2018-05-22 NOTE — H&P
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  She presented to the emergency department for evaluation of 5 days of blood per rectum.  She has had loose stools with blood mixed in for 5-7 days.  She has had several episodes where she spontaneously passed a small amounts of martin blood.  She had one episode of vomiting.  Of note, she did have colonoscopy maybe 6 years ago with [facility] as part of an evaluation because of family history of Crohn's disease.  She was not aware of any abnormality on that procedure.  Emergency department evaluation showed stable vital signs.  Labs were unremarkable including hemoglobin of 13.  INR was normal at 0.99.  CT of abdomen and pelvis showed some diverticulosis without diverticulitis.  There is no significant abnormality.  I was asked to admit Blsosom to the hospital for monitoring of lower GI bleeding.    Problem list:    1.  Lower GI bleed.  Consider due to diverticulosis.  Admit to observation.  Check serial hemoglobins.  Consult [facility].    2.  Type 2 diabetes.  Hold prior to admission Glucophage and Victoza.  NovoLog insulin sliding scale will be ordered.    3.  Asthma without acute exacerbation.    Full code  Ambulate for DVT prophylaxis  Disposition: Admit to observation           Code Status:   Full Code         Primary Care Physician:   Andrea Leach [phone]         Chief Complaint:   Rectal bleeding    History is obtained from Dr. Anila Cerrato, and the medical recored.         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  She presented to the emergency department for evaluation of 5 days of blood per rectum.  She has had loose stools with blood mixed in for 5-7 days.  She has had several episodes where she spontaneously passed a small amounts of martin blood.  She had one episode of vomiting.  Of note, she did have colonoscopy maybe 6 years ago with [facility] as part of an evaluation because of family history of Crohn's disease.  She was not aware of any abnormality on that procedure.  Emergency department evaluation showed stable vital signs.  Labs were unremarkable including hemoglobin of 13.  INR was normal at 0.99.  CT of abdomen and pelvis showed some diverticulosis without diverticulitis.  There is no significant abnormality.  I was asked to admit Blossom to the hospital for monitoring of lower GI bleeding.           Past Medical History:     Patient Active Problem List   Diagnosis     Kidney Duplication, longer abx duration     Menorrhagia     Allergic rhinitis     Elevated C-reactive protein (CRP)     Obesity     Moderate major depression (H)     Shoulder pain     CARDIOVASCULAR SCREENING; LDL GOAL LESS THAN 160     Contraception     Abnormal pelvic ultrasound     Mild major depression (H)     Recurrent miscarriages     Pure hypercholesterolemia     Anxiety     Pain in joint, pelvic region and thigh     Pregnant state, incidental     Nonspecific abnormal electrocardiogram (ECG) (EKG)     GI bleed      Past Medical History:   Diagnosis Date     Back strain 19 yo    work injury, chronic dialy pain, ibuprofen      Hyperlipidemia      Kidney duplication     2 left kidneys, 1 on right, all functioning     Miscarriage 12/08, 9/2011     Moderate major depression (H) 11/19/2009     Obesity      Renal stones     x2     S/P  colonoscopy 11/09    diarrhea             Past Surgical History:     Past Surgical History:   Procedure Laterality Date     CHOLECYSTECTOMY       HERNIA REPAIR       SURGICAL HISTORY OF -   21 yo    left inguinal hernia repair            Home Medications:     Prior to Admission medications    Medication Sig Last Dose Taking? Auth Provider   acetaminophen (TYLENOL) 325 MG tablet Take 325 mg by mouth every 6 hours as needed for mild pain  Yes Reported, Patient   Biotin 10 MG TABS tablet   Yes Reported, Patient   Calcium Carbonate (CALCIUM 600 PO) Take by mouth 2 times daily  Yes Reported, Patient   ergocalciferol (ERGOCALCIFEROL) 75797 UNITS capsule Take 50,000 Units by mouth once a week  Yes Reported, Patient   liraglutide (VICTOZA) 18 MG/3ML soln Inject Subcutaneous daily  Yes Reported, Patient   metFORMIN (GLUCOPHAGE-XR) 500 MG 24 hr tablet Take 500 mg by mouth Take 2 tablets in AM and 2 tablets in PM  Yes Reported, Patient   mometasone-formoterol (DULERA) 200-5 MCG/ACT oral inhaler Inhale 2 puffs into the lungs 2 times daily  Yes Reported, Patient   EPINEPHrine (EPIPEN) 0.3 MG/0.3ML injection Inject 0.3 mLs (0.3 mg) into the muscle once as needed for anaphylaxis  at not taking  Sundar Abdullahi APRN CNP   HYDROcodone-acetaminophen (NORCO) 5-325 MG per tablet Take 1-2 tablets by mouth every 4 hours as needed for moderate to severe pain  at nopt Carlos Le MD   PROAIR  (90 BASE) MCG/ACT IN AERS INHALE 1 TO 2 PUFFS EVERY 4 TO 6 HOURS AS NEEDED  at not tawking  Laura Jorge PA-C            Allergies:     Allergies   Allergen Reactions     Dust Mite Extract      Latex      Levaquin Swelling     Morphine Hives            Social History:     Social History   Substance Use Topics     Smoking status: Former Smoker     Types: Cigarettes     Smokeless tobacco: Never Used      Comment: quit in 2011     Alcohol use 0.0 oz/week     0 Standard drinks or equivalent per week      Comment: few  "times yearly             Family History:     Family History   Problem Relation Age of Onset     GASTROINTESTINAL DISEASE Mother      Crohns Disease     Myocardial Infarction Maternal Grandfather 70     2 times -? age, at ag eof 77     Cardiovascular Paternal Grandfather      chf      DIABETES Paternal Grandfather      Neurologic Disorder Paternal Grandfather      amyloid tumor     Cardiovascular Maternal Grandmother      chf      CANCER Father      Lung Cancer,smoker      Family History Negative Sister      1     Family History Negative Brother      1     Breast Cancer Other      cousins, mother's side of family -dx in 30     Cancer - colorectal No family hx of               Review of Systems:   The 10 point Review of Systems is negative other than as noted in the HPI.           Physical Exam:   Blood pressure 119/71, pulse 83, temperature 98.4  F (36.9  C), temperature source Oral, resp. rate 16, height 1.575 m (5' 2\"), weight 97.7 kg (215 lb 6.2 oz), last menstrual period 2017, SpO2 98 %.  215 lbs 6.23 oz    GENERAL: Pleasant and cooperative. No acute distress.  EYES: Pupils equal and round. No scleral erythema or icterus.  ENT: External ears are normal without deformity. Posterior oropharynx is without erythem, swelling, or exudate.  NECK: Supple. No masses or swelling. No tenderness. Thyroid is normal without mass or tenderness.  CHEST: Clear to auscultation. Normal breath sounds. No retractions.   CV: Regular rate and rhythm. No JVD. Pulses normal.  ABDOMEN: Bowel sounds present. No tenderness. No masses or hernia.  EXTREMETIES: No clubbing, cyanosis, or ischemia.  SKIN: Warm and dry to touch. No wounds or rashes.  NEUROLOGIC: Strength and sensation are normal. Deep tendon reflexes are normal. Cranial nerves are normal.             Data:   All new lab and imaging data was reviewed.     Results for orders placed or performed during the hospital encounter of 18 (from the past 24 hour(s))   CBC " with platelets differential   Result Value Ref Range    WBC 9.7 4.0 - 11.0 10e9/L    RBC Count 4.57 3.8 - 5.2 10e12/L    Hemoglobin 13.0 11.7 - 15.7 g/dL    Hematocrit 41.0 35.0 - 47.0 %    MCV 90 78 - 100 fl    MCH 28.4 26.5 - 33.0 pg    MCHC 31.7 31.5 - 36.5 g/dL    RDW 13.2 10.0 - 15.0 %    Platelet Count 256 150 - 450 10e9/L    Diff Method Automated Method     % Neutrophils 56.8 %    % Lymphocytes 32.0 %    % Monocytes 6.5 %    % Eosinophils 3.7 %    % Basophils 0.7 %    % Immature Granulocytes 0.3 %    Nucleated RBCs 0 0 /100    Absolute Neutrophil 5.5 1.6 - 8.3 10e9/L    Absolute Lymphocytes 3.1 0.8 - 5.3 10e9/L    Absolute Monocytes 0.6 0.0 - 1.3 10e9/L    Absolute Eosinophils 0.4 0.0 - 0.7 10e9/L    Absolute Basophils 0.1 0.0 - 0.2 10e9/L    Abs Immature Granulocytes 0.0 0 - 0.4 10e9/L    Absolute Nucleated RBC 0.0    INR   Result Value Ref Range    INR 0.99 0.86 - 1.14   Partial thromboplastin time   Result Value Ref Range    PTT 30 22 - 37 sec   Comprehensive metabolic panel   Result Value Ref Range    Sodium 140 133 - 144 mmol/L    Potassium 4.1 3.4 - 5.3 mmol/L    Chloride 108 94 - 109 mmol/L    Carbon Dioxide 24 20 - 32 mmol/L    Anion Gap 8 3 - 14 mmol/L    Glucose 81 70 - 99 mg/dL    Urea Nitrogen 13 7 - 30 mg/dL    Creatinine 0.62 0.52 - 1.04 mg/dL    GFR Estimate >90 >60 mL/min/1.7m2    GFR Estimate If Black >90 >60 mL/min/1.7m2    Calcium 9.0 8.5 - 10.1 mg/dL    Bilirubin Total 0.3 0.2 - 1.3 mg/dL    Albumin 4.0 3.4 - 5.0 g/dL    Protein Total 7.5 6.8 - 8.8 g/dL    Alkaline Phosphatase 72 40 - 150 U/L     (H) 0 - 50 U/L    AST 59 (H) 0 - 45 U/L   Lactic acid whole blood   Result Value Ref Range    Lactic Acid 1.1 0.7 - 2.0 mmol/L   Lipase   Result Value Ref Range    Lipase 261 73 - 393 U/L   ISTAT HCG Quantitative Pregnancy POCT   Result Value Ref Range    HCG Quantitative Serum <5.0 <5.0 IU/L   UA with Microscopic   Result Value Ref Range    Color Urine Straw     Appearance Urine Clear      Glucose Urine Negative NEG^Negative mg/dL    Bilirubin Urine Negative NEG^Negative    Ketones Urine Negative NEG^Negative mg/dL    Specific Gravity Urine 1.002 (L) 1.003 - 1.035    Blood Urine Large (A) NEG^Negative    pH Urine 6.0 5.0 - 7.0 pH    Protein Albumin Urine Negative NEG^Negative mg/dL    Urobilinogen mg/dL 0.0 0.0 - 2.0 mg/dL    Nitrite Urine Negative NEG^Negative    Leukocyte Esterase Urine Negative NEG^Negative    Source Midstream Urine     WBC Urine <1 0 - 5 /HPF    RBC Urine 3 (H) 0 - 2 /HPF    Bacteria Urine Few (A) NEG^Negative /HPF    Squamous Epithelial /HPF Urine 1 0 - 1 /HPF    Mucous Urine Present (A) NEG^Negative /LPF   CT Abdomen Pelvis w Contrast    Narrative    CT ABDOMEN PELVIS W CONTRAST  5/21/2018 11:41 PM     HISTORY: Lower abdominal pain, back pain, hematochezia.    TECHNIQUE: CT abdomen and pelvis with 100 mL Isovue-370 IV. Radiation  dose for this scan was reduced using automated exposure control,  adjustment of the mA and/or kV according to patient size, or iterative  reconstruction technique.    COMPARISON: 10/23/2017.    FINDINGS:  Abdomen: There is minimal dependent atelectasis at the lung bases. The  heart size is normal. The liver, spleen, pancreas, adrenal glands and  kidneys are normal in appearance. The gallbladder is absent. There is  no abdominal or pelvic lymph node enlargement.    Pelvis: There is an IUD in place. No adnexal mass. There are colonic  diverticula without acute diverticulitis. There is no bowel  obstruction or inflammation. The appendix is normal. Small bowel is  within normal limits. No free intraperitoneal gas or fluid. A few  pelvic phleboliths.      Impression    IMPRESSION:  1. No acute abnormality. No bowel obstruction or inflammation.  2. Colonic diverticula without acute diverticulitis.  3. IUD in place.   Glucose by meter   Result Value Ref Range    Glucose 82 70 - 99 mg/dL

## 2018-05-22 NOTE — ED NOTES
[facility]  ED Nurse Handoff Report    Blossom Hale is a 32 year old female   ED Chief complaint: Rectal Bleeding (rectal bleed and vomiting blood for a week)  . ED Diagnosis:   Final diagnoses:   Rectal bleeding   Elevated LFTs     Allergies:   Allergies   Allergen Reactions     Dust Mite Extract      Latex      Levaquin Swelling     Morphine Hives       Code Status: Full Code  Activity level - Baseline/Home:  Independent. Activity Level - Current:   Independent. Lift room needed: No. Bariatric: No   Needed: No   Isolation: No. Infection: Not Applicable.     Vital Signs:   Vitals:    05/21/18 2131 05/22/18 0000 05/22/18 0015 05/22/18 0030   BP: 135/87 115/66 116/75 125/89   Pulse: 83      Resp: 18      Temp: 97.4  F (36.3  C)      TempSrc: Temporal      SpO2: 100% 98% 100% 100%   Weight: 99.2 kg (218 lb 11.1 oz)          Cardiac Rhythm:  ,      Pain level: 0-10 Pain Scale: 7  Patient confused: No. Patient Falls Risk: No.   Elimination Status: Has voided   Patient Report - Initial Complaint: Blossom Hale is a 32 year old female with a history of hyperlipidemia, PCOS, and iron deficiency anemia in pregnancy (not on oral iron supplementation) who presents for evaluation of rectal bleeding. The patient reports intermittent rectal bleeding for the last week. She reports it started out as bright red blood on the toilet paper but now she is noticing bright red blood in her underware. She states rectal bleeding is worse when she is walking around at work. The patient also reports 4 episodes of emesis over the last week. The first 3 episodes were nonbloody, but she vomited today and noticed a few pink streaks in the vomit. The patient has also noticed blood in her urine whenever she wipes, but no blood in the toilet bowl. She reports she has a history of intermittent hematuria in the past related to her polycystic ovarian syndrome. The patient was concerned about the rectal bleeding, prompting  the visit to the ED. She also reports intermittent pain in her lower abdomen and lower back. She is having loose stools as well. The patient denies any fevers. She denies any pain with bowel movements, shortness of breath, light-headedness, syncope, or chest pain. She report she has been very stressed recently while studying for her [employer]. The patient denies any history of chron's, IBS, liver problems, or esophageal varices. She denies regular alcohol use. She had a colonoscopy in 2009 that was normal. She is s/p cholecystectomy. She is not anticoagulated. Focused Assessment:   Gastrointestinal Gastrointestinal - GI WDL: GI symptoms; nausea and vomiting; stool Nausea/Vomiting Signs/Symptoms: nausea continuous; emesis (noticed blood in vomit) Stool Consistency: loose (bright red blood in stool) GI Signs/Symptoms: abdominal pain (lower abdominal pain)     Tests Performed: labs, CT abdomen. Abnormal Results:   Labs Ordered and Resulted from Time of ED Arrival Up to the Time of Departure from the ED   COMPREHENSIVE METABOLIC PANEL - Abnormal; Notable for the following:        Result Value     (*)     AST 59 (*)     All other components within normal limits   ROUTINE UA WITH MICROSCOPIC - Abnormal; Notable for the following:     Specific Gravity Urine 1.002 (*)     Blood Urine Large (*)     RBC Urine 3 (*)     Bacteria Urine Few (*)     Mucous Urine Present (*)     All other components within normal limits   CBC WITH PLATELETS DIFFERENTIAL   INR   PARTIAL THROMBOPLASTIN TIME   LACTIC ACID WHOLE BLOOD   LIPASE   FREE WATER   ISTAT HCG QUANTITATIVE PREGNANCY NURSING POCT   ISTAT HCG QUANTITATIVE PREGNANCY POCT     CT Abdomen Pelvis w Contrast   Preliminary Result   IMPRESSION:   1. No acute abnormality. No bowel obstruction or inflammation.   2. Colonic diverticula without acute diverticulitis.   3. IUD in place.         Treatments provided: Fluids, Anti-nausea  Family Comments: NEHA  OBS brochure/video  discussed/provided to patient:  Yes  ED Medications:   Medications   0.9% sodium chloride BOLUS (1,000 mLs Intravenous New Bag 5/21/18 2225)     Followed by   sodium chloride 0.9% infusion (not administered)   ondansetron (ZOFRAN) injection 4 mg (4 mg Intravenous Given 5/21/18 2226)   iopamidol (ISOVUE-370) solution 500 mL (100 mLs Intravenous Given 5/21/18 2336)   0.9% sodium chloride BOLUS (0 mLs Intravenous Stopped 5/21/18 2337)     Drips infusing:  No  For the majority of the shift, the patient's behavior Green. Interventions performed were NA.     Severe Sepsis OR Septic Shock Diagnosis Present: No      ED Nurse Name/Phone Number: Klever Olivarez,   12:57 AM    RECEIVING UNIT ED HANDOFF REVIEW    Above ED Nurse Handoff Report was reviewed:   Reviewed by: Shirlene Xie on May 22, 2018 at 1:51 AM

## 2018-05-22 NOTE — PLAN OF CARE
"Problem: Patient Care Overview  Goal: Plan of Care/Patient Progress Review  Outcome: Improving  Afebrile. Rates abdominal cramping a \"4\" but declines needing medication. Heat applied to abdomen. Abdomen tender bilaterally in lower quadrants. C/O nausea; relieved with IV antiemetic. Loose-soft stools times three; stool sent to lab. No blood noted in stool. Tolerating clear liquids. Voiding. Up in room independently. Continues to receive IV fluids. Plan for EGD and Colonoscopy tomorrow. Plan to initiate prep later this afternoon per orders. Will continue to monitor and provide for needs.       "

## 2018-05-23 ENCOUNTER — SURGERY (OUTPATIENT)
Age: 33
End: 2018-05-23

## 2018-05-23 VITALS
WEIGHT: 215.39 LBS | BODY MASS INDEX: 39.64 KG/M2 | RESPIRATION RATE: 16 BRPM | HEIGHT: 62 IN | SYSTOLIC BLOOD PRESSURE: 125 MMHG | TEMPERATURE: 98.3 F | HEART RATE: 63 BPM | OXYGEN SATURATION: 99 % | DIASTOLIC BLOOD PRESSURE: 72 MMHG

## 2018-05-23 LAB
COLONOSCOPY: NORMAL
HGB BLD-MCNC: 11.5 G/DL (ref 11.7–15.7)
UPPER GI ENDOSCOPY: NORMAL

## 2018-05-23 PROCEDURE — 45378 DIAGNOSTIC COLONOSCOPY: CPT | Performed by: INTERNAL MEDICINE

## 2018-05-23 PROCEDURE — 25000132 ZZH RX MED GY IP 250 OP 250 PS 637: Performed by: INTERNAL MEDICINE

## 2018-05-23 PROCEDURE — G0500 MOD SEDAT ENDO SERVICE >5YRS: HCPCS | Performed by: INTERNAL MEDICINE

## 2018-05-23 PROCEDURE — 36415 COLL VENOUS BLD VENIPUNCTURE: CPT | Performed by: INTERNAL MEDICINE

## 2018-05-23 PROCEDURE — 43235 EGD DIAGNOSTIC BRUSH WASH: CPT | Performed by: INTERNAL MEDICINE

## 2018-05-23 PROCEDURE — 25000128 H RX IP 250 OP 636: Performed by: INTERNAL MEDICINE

## 2018-05-23 PROCEDURE — 85018 HEMOGLOBIN: CPT | Performed by: INTERNAL MEDICINE

## 2018-05-23 PROCEDURE — 99217 ZZC OBSERVATION CARE DISCHARGE: CPT | Performed by: INTERNAL MEDICINE

## 2018-05-23 PROCEDURE — 25000125 ZZHC RX 250: Performed by: INTERNAL MEDICINE

## 2018-05-23 PROCEDURE — 25000132 ZZH RX MED GY IP 250 OP 250 PS 637: Performed by: PHYSICIAN ASSISTANT

## 2018-05-23 PROCEDURE — G0378 HOSPITAL OBSERVATION PER HR: HCPCS

## 2018-05-23 RX ORDER — FENTANYL CITRATE 50 UG/ML
INJECTION, SOLUTION INTRAMUSCULAR; INTRAVENOUS PRN
Status: DISCONTINUED | OUTPATIENT
Start: 2018-05-23 | End: 2018-05-23 | Stop reason: HOSPADM

## 2018-05-23 RX ORDER — NALOXONE HYDROCHLORIDE 0.4 MG/ML
.1-.4 INJECTION, SOLUTION INTRAMUSCULAR; INTRAVENOUS; SUBCUTANEOUS
Status: DISCONTINUED | OUTPATIENT
Start: 2018-05-23 | End: 2018-05-23 | Stop reason: HOSPADM

## 2018-05-23 RX ORDER — LIDOCAINE 40 MG/G
CREAM TOPICAL
Status: DISCONTINUED | OUTPATIENT
Start: 2018-05-23 | End: 2018-05-23 | Stop reason: HOSPADM

## 2018-05-23 RX ORDER — FLUMAZENIL 0.1 MG/ML
0.2 INJECTION, SOLUTION INTRAVENOUS
Status: DISCONTINUED | OUTPATIENT
Start: 2018-05-23 | End: 2018-05-23 | Stop reason: HOSPADM

## 2018-05-23 RX ADMIN — MIDAZOLAM 0.5 MG: 1 INJECTION INTRAMUSCULAR; INTRAVENOUS at 12:24

## 2018-05-23 RX ADMIN — Medication 1 MG: at 00:13

## 2018-05-23 RX ADMIN — MAGESIUM CITRATE 296 ML: 1.75 LIQUID ORAL at 07:37

## 2018-05-23 RX ADMIN — FENTANYL CITRATE 50 MCG: 50 INJECTION, SOLUTION INTRAMUSCULAR; INTRAVENOUS at 12:36

## 2018-05-23 RX ADMIN — PANTOPRAZOLE SODIUM 40 MG: 40 TABLET, DELAYED RELEASE ORAL at 07:36

## 2018-05-23 RX ADMIN — TOPICAL ANESTHETIC 1 EACH: 200 SPRAY DENTAL; PERIODONTAL at 12:23

## 2018-05-23 RX ADMIN — SODIUM CHLORIDE, POTASSIUM CHLORIDE, SODIUM LACTATE AND CALCIUM CHLORIDE: 600; 310; 30; 20 INJECTION, SOLUTION INTRAVENOUS at 07:38

## 2018-05-23 RX ADMIN — FENTANYL CITRATE 100 MCG: 50 INJECTION, SOLUTION INTRAMUSCULAR; INTRAVENOUS at 12:22

## 2018-05-23 RX ADMIN — MIDAZOLAM 2 MG: 1 INJECTION INTRAMUSCULAR; INTRAVENOUS at 12:22

## 2018-05-23 RX ADMIN — ACETAMINOPHEN 650 MG: 325 TABLET ORAL at 14:10

## 2018-05-23 RX ADMIN — MIDAZOLAM 0.5 MG: 1 INJECTION INTRAMUSCULAR; INTRAVENOUS at 12:37

## 2018-05-23 NOTE — PROCEDURES
Pre-Endoscopy History and Physical     Blossom Hale MRN# [id number]   YOB: 1985 Age: 32 year old     Date of Procedure: 5/21/2018  Primary care provider: Andrea Leach  Type of Endoscopy: colonoscopy and esophagogastroduodenoscopy (upper GI endoscopy)  Reason for Procedure: hematemesis, bloody diarrhea  Type of Anesthesia Anticipated: Moderate (conscious) sedation    HPI:    Blossom is a 32 year old female who will be undergoing the above procedure.      A history and physical has been performed. The patient's medications and allergies have been reviewed. The risks and benefits of the procedure and the sedation options and risks were discussed with the patient.  All questions were answered and informed consent was obtained.      Allergies   Allergen Reactions     Dust Mite Extract      Latex      Levaquin Swelling     Morphine Hives        Current Facility-Administered Medications   Medication     acetaminophen (TYLENOL) tablet 650 mg     albuterol (PROAIR HFA/PROVENTIL HFA/VENTOLIN HFA) Inhaler 1-2 puff     glucose gel 15-30 g    Or     dextrose 50 % injection 25-50 mL    Or     glucagon injection 1 mg     fluticasone-vilanterol (BREO ELLIPTA) 200-25 MCG/INH oral inhaler 1 puff     lactated ringers infusion     lidocaine (LMX4) kit     lidocaine (LMX4) kit     lidocaine 1 % 1 mL     lidocaine 1 % 1 mL     May continue current IV fluids if patient has IV fluids infusing.     May continue current IV fluids if patient has IV fluids infusing.     May take regular AM medications except those listed below     May take regular AM medications except those listed below     melatonin tablet 1 mg     naloxone (NARCAN) injection 0.1-0.4 mg     ondansetron (ZOFRAN-ODT) ODT tab 4 mg    Or     ondansetron (ZOFRAN) injection 4 mg     pantoprazole (PROTONIX) EC tablet 40 mg     sodium chloride (PF) 0.9% PF flush 3 mL     sodium chloride (PF) 0.9% PF flush 3 mL     sodium chloride (PF) 0.9% PF flush 3 mL      sodium chloride (PF) 0.9% PF flush 3 mL       Patient Active Problem List   Diagnosis     Kidney Duplication, longer abx duration     Menorrhagia     Allergic rhinitis     Elevated C-reactive protein (CRP)     Obesity     Moderate major depression (H)     Shoulder pain     CARDIOVASCULAR SCREENING; LDL GOAL LESS THAN 160     Contraception     Abnormal pelvic ultrasound     Mild major depression (H)     Recurrent miscarriages     Pure hypercholesterolemia     Anxiety     Pain in joint, pelvic region and thigh     Pregnant state, incidental     Nonspecific abnormal electrocardiogram (ECG) (EKG)     GI bleed        Past Medical History:   Diagnosis Date     Back strain 21 yo    work injury, chronic dialy pain, ibuprofen      Depressive disorder      Hyperlipidemia      Kidney duplication     2 left kidneys, 1 on right, all functioning     Miscarriage , 2011     Moderate major depression (H) 2009     Obesity      Renal stones     x2     S/P colonoscopy     diarrhea        Past Surgical History:   Procedure Laterality Date     CHOLECYSTECTOMY       COLONOSCOPY       HERNIA REPAIR       SURGICAL HISTORY OF -   21 yo    left inguinal hernia repair       Social History   Substance Use Topics     Smoking status: Former Smoker     Types: Cigarettes     Smokeless tobacco: Never Used      Comment: quit in      Alcohol use 0.0 oz/week     0 Standard drinks or equivalent per week      Comment: few times yearly       Family History   Problem Relation Age of Onset     GASTROINTESTINAL DISEASE Mother      Crohns Disease     Myocardial Infarction Maternal Grandfather 70     2 times -? age, at ag eof 77     Cardiovascular Paternal Grandfather      chf      DIABETES Paternal Grandfather      Neurologic Disorder Paternal Grandfather      amyloid tumor     Cardiovascular Maternal Grandmother      chf      CANCER Father      Lung Cancer,smoker      Family History Negative Sister      1     Family History Negative  "Brother      1     Breast Cancer Other      cousins, mother's side of family -dx in 30     Cancer - colorectal No family hx of      Colon Cancer No family hx of             Medications:     Prescriptions Prior to Admission   Medication Sig Dispense Refill Last Dose     acetaminophen (TYLENOL) 325 MG tablet Take 325 mg by mouth every 6 hours as needed for mild pain   Past Week at Unknown time     BIOTIN PO Take by mouth daily   5/21/2018 at Unknown time     Calcium Carbonate (CALCIUM 600 PO) Take 600 mg by mouth 2 times daily    5/21/2018 at Unknown time     ergocalciferol (ERGOCALCIFEROL) 52048 UNITS capsule Take 50,000 Units by mouth once a week   5/16/2018     liraglutide (VICTOZA) 18 MG/3ML soln Inject 0.6 mg Subcutaneous At Bedtime    5/20/2018 at Unknown time     metFORMIN (GLUCOPHAGE-XR) 500 MG 24 hr tablet Take 1,000 mg by mouth 2 times daily (with meals)    5/21/2018 at Unknown time     mometasone-formoterol (DULERA) 200-5 MCG/ACT oral inhaler Inhale 2 puffs into the lungs 2 times daily   5/21/2018 at Unknown time     TOPIRAMATE PO Take 25 mg by mouth 2 times daily   5/21/2018 at Unknown time       Scheduled Medications:    fluticasone-vilanterol  1 puff Inhalation Daily     pantoprazole  40 mg Oral QAM     sodium chloride (PF)  3 mL Intracatheter Q8H     sodium chloride (PF)  3 mL Intracatheter Q8H       PRN:  acetaminophen, albuterol, glucose **OR** dextrose **OR** glucagon, lidocaine 4%, lidocaine 4%, lidocaine (buffered or not buffered), lidocaine (buffered or not buffered), - MEDICATION INSTRUCTIONS -, - MEDICATION INSTRUCTIONS -, - MEDICATION INSTRUCTIONS -, - MEDICATION INSTRUCTIONS -, melatonin, naloxone, ondansetron **OR** ondansetron, sodium chloride (PF), sodium chloride (PF)    PHYSICAL EXAM:   /71  Pulse 72  Temp 96.9  F (36.1  C) (Oral)  Resp 12  Ht 1.575 m (5' 2\")  Wt 97.7 kg (215 lb 6.2 oz)  LMP 11/01/2017  SpO2 100%  BMI 39.4 kg/m2 Estimated body mass index is 39.4 kg/(m^2) as " "calculated from the following:    Height as of this encounter: 1.575 m (5' 2\").    Weight as of this encounter: 97.7 kg (215 lb 6.2 oz).   RESP: lungs clear to auscultation - no rales, rhonchi or wheezes  CV: regular rates and rhythm    IMPRESSION   ASA Class 2 - Mild systemic disease      Signed Electronically by: Bert Faria MD  May 23, 2018    .            "

## 2018-05-23 NOTE — PLAN OF CARE
Problem: Patient Care Overview  Goal: Plan of Care/Patient Progress Review  Transferred to Endoscopy for EGD and Colonoscopy.

## 2018-05-23 NOTE — PROGRESS NOTES
Update:    Patient seen and examined, EGD without abnormality, colonoscopy with diverticuli but no other pathology.  I also discussed her care with Dr. Faria of GI.  Plan to advance her diet and may discharge home if she can tolerate a full liquid diet this evening.  If she cannot tolerate full liquids or feels more ill, discussed with nursing staff to just cancel the discharge and keep her overnight for ongoing hydration.  Discharge orders and note in.      Daniel Call MD

## 2018-05-23 NOTE — PLAN OF CARE
Problem: Gastrointestinal Bleeding (Adult)  Goal: Signs and Symptoms of Listed Potential Problems Will be Absent, Minimized or Managed (Gastrointestinal Bleeding)  Signs and symptoms of listed potential problems will be absent, minimized or managed by discharge/transition of care (reference Gastrointestinal Bleeding (Adult) CPG).   Outcome: No Change  R.N.  VSS, slept better tonight,took melatonin for sleep, will take mag citrate this AM for EGD and colanoscopy, then NPO At 0800, denies c/o pain, had one clearish stool, no rectal bleeding,will continue to monitor closely and provide for needs

## 2018-05-23 NOTE — PLAN OF CARE
Problem: Patient Care Overview  Goal: Plan of Care/Patient Progress Review  Outcome: No Change  Afebrile, VSS, meeting pain goal and declining pain meds t/o shift. Cyndi cl liq diet. No nausea. Did drink miralax prep for colonoscopy, EGD tomorrow. Stools liq, orange red in color. Gatorade orange, stool light red, pt feels rectal bleeding may have started in small amt with GI prep. Hgb stable. IV fluids cont at 100/hr. Will be NPO after 0800 in am.

## 2018-05-23 NOTE — OR NURSING
Patient tolerated the procedure well. Report called to patient primary rn. Patient in recovery in a stable condition.

## 2018-05-23 NOTE — DISCHARGE INSTRUCTIONS
Understanding Diverticulosis and Diverticulitis     Pouches or diverticula usually occur in the lower part of the colon called the sigmoid.      Diverticulitis occurs when the pouches become inflamed.     The colon (large intestine) is the last part of the digestive tract. It absorbs water from stool and changes it from a liquid to a solid. In certain cases, small pouches called diverticula can form in the colon wall. This condition is called diverticulosis. The pouches can become infected. If this happens, it becomes a more serious problem called diverticulitis. These problems can be painful. But they can be managed.   Managing Your Condition  Diet changes or taking medications are often tried first. These may be enough to bring relief. If the case is bad, surgery may be done. You and your doctor can discuss the plan that is best for you.  If You Have Diverticulosis  Diet changes are often enough to control symptoms. The main changes are adding fiber (roughage) and drinking more water. Fiber absorbs water as it travels through your colon. This helps your stool stay soft and move smoothly. Water helps this process. If needed, you may be told to take over-the-counter stool softeners. To help relieve pain, antispasmodic medications may be prescribed.  If You Have Diverticulitis  Treatment depends on how bad your symptoms are.  For mild symptoms: You may be put on a liquid diet for a short time. You may also be prescribed antibiotics. If these two steps relieve your symptoms, you may then be prescribed a high-fiber diet. If you still have symptoms, your doctor will discuss further treatment options with you.  For severe symptoms: You may need to be admitted to the hospital. There, you can be given IV antibiotics and fluids. Once symptoms are under control, the above treatments may be tried. If these don t control your condition, your doctor may discuss the option of having surgery with you.  Trainer to Colon  Health  Help keep your colon healthy with a diet that includes plenty of high-fiber fruits, vegetables, and whole grains. Drink plenty of liquids like water and juice. Your doctor may also recommend avoiding seeds and nuts.          [id number] [employer], [address] [place] [address], [place] [address]. All rights reserved. This information is not intended as a substitute for professional medical care. Always follow your healthcare professional's instructions.

## 2018-05-23 NOTE — PLAN OF CARE
"Problem: Patient Care Overview  Goal: Discharge Needs Assessment  Outcome: Adequate for Discharge Date Met: 05/23/18  Pt is feeling well after tolerating full liquid diet.  No complaints of pain or nausea. Pt feels ready to discharge home.  /72  Pulse 63  Temp 98.3  F (36.8  C) (Oral)  Resp 16  Ht 1.575 m (5' 2\")  Wt 97.7 kg (215 lb 6.2 oz)  LMP 11/01/2017  SpO2 99%  BMI 39.4 kg/m2     Pt verbalizes understanding of dc instructions.  Instructed to follow up with PCP should any further problems arise with bleeding.  Instructed to return to ED if the amount of blood is concerning at all.    "

## 2018-05-23 NOTE — DISCHARGE SUMMARY
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  She presented to the emergency department for evaluation of 5 days of blood per rectum.  She has had loose stools with blood mixed in for 5-7 days.  She has had several episodes where she spontaneously passed a small amounts of martin blood.  She had one episode of vomiting.  Of note, she did have colonoscopy maybe 6 years ago with [facility] as part of an evaluation because of family history of Crohn's disease.  She was not aware of any abnormality on that procedure.  Emergency department evaluation showed stable vital signs.  Labs were unremarkable including hemoglobin of 13.  INR was normal at 0.99.  CT of abdomen and pelvis showed some diverticulosis without diverticulitis.      Blossom was admitted under observation status to the hospital for further evaluation.  Her symptoms gradually improved with IV fluids and supportive cares.  Hemoglobin and vital signs remained stable.  She was seen by [facility] and underwent upper and lower endoscopies on 5/23 which showed colonic diverticuli but no other major abnormalities such as evidence of inflammatory bowel disease or specific infection.  At this point she is advancing her diet and pending good tolerance of oral intake and discharge home later today with outpatient follow-up as needed.     Problem list:     1.  Lower GI bleed.  Consider due to diverticulosis.    Question  superimposed viral gastroenteritis.  Symptoms resolving, endoscopy is unrevealing of acute pathology as above.     2.    PCOS.    She states this is the indication for the Glucophage and Victoza and denies any prior diagnosis of type 2 diabetes.  Blood sugars were normal here so he stopped checks.     3.  Asthma without acute exacerbation      # Discharge Pain Plan: Case pain is essentially resolved at this point and was initially due to likely gastroenteritis.  No new pain medication prescriptions upon discharge          Discharge Disposition:  Discharged to home     Allergies:  Allergies   Allergen Reactions     Dust Mite Extract      Latex      Levaquin Swelling     Morphine Hives        Discharge Medications:        Review of your medicines      CONTINUE these medicines which have NOT CHANGED       Dose / Directions    acetaminophen 325 MG tablet   Commonly known as:  TYLENOL        Dose:  325 mg   Take 325 mg by mouth every 6 hours as needed for mild pain   Refills:  0       BIOTIN PO        Take by mouth daily   Refills:  0       CALCIUM 600 PO        Dose:  600 mg   Take 600 mg by mouth 2 times daily   Refills:  0       ergocalciferol 38510 units capsule   Commonly known as:  ERGOCALCIFEROL        Dose:  32926 Units   Take 50,000 Units by mouth once a week   Refills:  0       liraglutide 18 MG/3ML soln   Commonly known as:  VICTOZA        Dose:  0.6 mg   Inject 0.6 mg Subcutaneous At Bedtime   Refills:  0       metFORMIN 500 MG 24 hr tablet   Commonly known as:  GLUCOPHAGE-XR        Dose:  1000 mg   Take 1,000 mg by mouth 2 times daily (with meals)   Refills:  0       mometasone-formoterol 200-5 MCG/ACT oral inhaler   Commonly known as:  DULERA        Dose:  2 puff   Inhale 2 puffs into the lungs 2 times daily   Refills:  0       TOPIRAMATE PO        Dose:  25 mg   Take 25 mg by mouth 2 times daily   Refills:  0             Condition on Discharge:  Discharge condition: Stable       Code status on discharge:  "Full Code     History of Illness:  See detailed admission note for full details.    Physical Exam:  Vital signs:  Temp: 96.9  F (36.1  C) Temp src: Oral BP: 112/59 Pulse: 72 Heart Rate: 96 Resp: 16 SpO2: 96 % O2 Device: None (Room air)   Height: 157.5 cm (5' 2\") Weight: 97.7 kg (215 lb 6.2 oz)  Estimated body mass index is 39.4 kg/(m^2) as calculated from the following:    Height as of this encounter: 1.575 m (5' 2\").    Weight as of this encounter: 97.7 kg (215 lb 6.2 oz).    Wt Readings from Last 1 Encounters:   05/22/18 97.7 kg (215 lb 6.2 oz)     General: Alert, awake, no acute distress.  HEENT: NC/AT, eyes anicteric, external occular movements intact, face symmetric.  Dentition WNL, MM moist.  Cardiac: RRR, S1, S2.  No murmurs appreciated.  Pulmonary: Normal chest rise, normal work of breathing.  Lungs CTA BL  Abdomen: soft, non-tender, non-distended.  Bowel Sounds Present.  No guarding.  Extremities: no deformities.  Warm, well perfused.  Skin: no rashes or lesions noted.  Warm and Dry.  Neuro: No focal deficits noted.  Speech clear.  Coordination and strength grossly normal.  Psych: Appropriate affect.    Procedures other than Imaging:  Upper and lower endoscopies revealing colonic diverticuli but no other major abnormalities.  Please see reports for full details.     Imaging:  Recent Results (from the past 48 hour(s))   CT Abdomen Pelvis w Contrast    Narrative    CT ABDOMEN PELVIS W CONTRAST  5/21/2018 11:41 PM     HISTORY: Lower abdominal pain, back pain, hematochezia.    TECHNIQUE: CT abdomen and pelvis with 100 mL Isovue-370 IV. Radiation  dose for this scan was reduced using automated exposure control,  adjustment of the mA and/or kV according to patient size, or iterative  reconstruction technique.    COMPARISON: 10/23/2017.    FINDINGS:  Abdomen: There is minimal dependent atelectasis at the lung bases. The  heart size is normal. The liver, spleen, pancreas, adrenal glands and  kidneys are normal in " appearance. The gallbladder is absent. There is  no abdominal or pelvic lymph node enlargement.    Pelvis: There is an IUD in place. No adnexal mass. There are colonic  diverticula without acute diverticulitis. There is no bowel  obstruction or inflammation. The appendix is normal. Small bowel is  within normal limits. No free intraperitoneal gas or fluid. A few  pelvic phleboliths.      Impression    IMPRESSION:  1. No acute abnormality. No bowel obstruction or inflammation.  2. Colonic diverticula without acute diverticulitis.  3. IUD in place.    AYAH CHIRINOS MD        Consultations:  GI consult.       Recent Lab Results:    Recent Labs  Lab 05/23/18  0611 05/22/18  1805 05/22/18  0655 05/21/18  2204   WBC  --   --   --  9.7   HGB 11.5* 11.8 11.8 13.0   HCT  --   --   --  41.0   MCV  --   --   --  90   PLT  --   --   --  256          Lab Results   Component Value Date     05/21/2018     10/23/2017     12/14/2012    Lab Results   Component Value Date    CHLORIDE 108 05/21/2018    CHLORIDE 108 10/23/2017    CHLORIDE 104 12/14/2012    Lab Results   Component Value Date    BUN 13 05/21/2018    BUN 8 10/23/2017    BUN 10 12/14/2012      Lab Results   Component Value Date    POTASSIUM 4.1 05/21/2018    POTASSIUM 3.7 10/23/2017    POTASSIUM 4.0 12/14/2012    Lab Results   Component Value Date    CO2 24 05/21/2018    CO2 24 10/23/2017    CO2 25 12/14/2012    Lab Results   Component Value Date    CR 0.62 05/21/2018    CR 0.60 10/23/2017    CR 0.50 12/14/2012             Pending Results:    Unresulted Labs Ordered in the Past 30 Days of this Admission     No orders found from 3/22/2018 to 5/22/2018.           Discharge Instructions and Follow-Up:     Discharge Procedure Orders  Reason for your hospital stay   Order Comments: You were admitted for abdominal discomfort, nausea vomiting and bleeding per rectum.  Your treated with IV fluids and seen by a GI specialist.  Thankfully your upper endoscopy did  not show any apparent significant abnormality and your colonoscopy showed diverticuli (which often cause painless bleeding that typically resolves on its own) but no evidence of infection in the colon or inflammatory bowel disease was apparent.  As per the GI specialist they suspect that a small bowel infection/gastroenteritis was causing some of your symptoms.    At this point the plan is for you to maintain good hydration and nutrition at home and recover there.  No further specific follow-up is needed unless her symptoms do not resolve or worsen.     Follow-up and recommended labs and tests    Order Comments: Follow-up as needed with her primary care doctor, particularly if symptoms do not resolve.     Activity   Order Comments: Your activity upon discharge: Gradually resume usual activity as tolerated   Order Specific Question Answer Comments   Is discharge order? Yes      Full Code     Diet   Order Comments: Follow this diet upon discharge: Advance her diet slowly as tolerated.  Try to maintain good hydration by mouth.   Order Specific Question Answer Comments   Is discharge order? Yes          Total time spent in face to face contact with the patient and coordinating discharge was:  30 Minutes.

## 2018-05-23 NOTE — PLAN OF CARE
Problem: Patient Care Overview  Goal: Plan of Care/Patient Progress Review  Outcome: Improving  Afebrile. Denies pain. No stools since returning from Endo. Hypoactive bowel sounds. Denies passing flatus. Tolerating water. Void times one. Plan to DC this evening if tolerates full liquids per Provider. Will continue to monitor and provide for needs.

## 2019-01-14 DIAGNOSIS — Z53.9 ERRONEOUS ENCOUNTER--DISREGARD: Primary | ICD-10-CM

## 2019-02-04 DIAGNOSIS — Z53.9 ERRONEOUS ENCOUNTER--DISREGARD: Primary | ICD-10-CM

## 2020-02-23 ENCOUNTER — HEALTH MAINTENANCE LETTER (OUTPATIENT)
Age: 35
End: 2020-02-23

## 2020-04-21 ENCOUNTER — VIRTUAL VISIT (OUTPATIENT)
Dept: FAMILY MEDICINE | Facility: OTHER | Age: 35
End: 2020-04-21

## 2020-04-21 NOTE — PROGRESS NOTES
"Date: 2020 17:54:09  Clinician: Destini Starr  Clinician NPI: [id number]  Patient: Blossom Hale  Patient : 1985  Patient Address: [address] [place] [address] [place] [address]  Patient Phone: [phone]  Visit Protocol: URI  Patient Summary:  Blossom is a 34 year old ( : 1985 ) female who initiated a Visit for COVID-19 (Coronavirus) evaluation and screening. When asked the question \"Please sign me up to receive news, health information and promotions from [employer].\", Blossom responded \"No\".    Blossom states her symptoms started gradually 5-6 days ago. After her symptoms started, they improved and then got worse again.   Her symptoms consist of rhinitis, enlarged lymph nodes, chills, diarrhea, a sore throat, a cough, nasal congestion, malaise, myalgia, nausea, and a headache. She is experiencing mild difficulty breathing with activities but can speak normally in full sentences. Blossom also feels feverish.   Symptom details     Nasal secretions: The color of her mucus is clear.    Cough: Blossom coughs every 5-10 minutes and her cough is more bothersome at night. Phlegm does not come into her throat when she coughs. She believes her cough is caused by post-nasal drip.     Sore throat: Blossom reports having mild throat pain (1-3 on a 10 point pain scale), does not have exudate on her tonsils, and can swallow liquids. The lymph nodes in her neck are enlarged. A rash has not appeared on the skin since the sore throat started.     Temperature: Her current temperature is 98.2 degrees Fahrenheit.     Headache: She states the headache is moderate (4-6 on a 10 point pain scale).      Blossom denies having facial pain or pressure, ear pain, vomiting, teeth pain, wheezing, anosmia, and ageusia. She also denies having recent facial or sinus surgery in the past 60 days.   Precipitating events  Within the past week, Blossom has not been exposed to someone with strep throat. She has not recently been exposed to someone with " influenza. Blossom has been in close contact with the following high risk individuals: adults 65 or older, children under the age of 5, people with asthma, heart disease or diabetes, pregnant women, and immunocompromised people.   Pertinent COVID-19 (Coronavirus) information  Blossom has not traveled internationally or to the areas where COVID-19 (Coronavirus) is widespread, including cruise ship travel in the last 14 days before the start of her symptoms.   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   Blossom has had a close contact with a laboratory-confirmed COVID-19 patient within 14 days of symptom onset. She also has had a close contact with a suspected COVID-19 patient within 14 days of symptom onset. She was exposed at her work. Additional information about contact with COVID-19 (Coronavirus) patient as reported by the patient (free text): April 8th and 9th two separate patients, and times   Pertinent medical history  Blossom had 1 sinus infection within the past year.   Blossom has taken an antibiotic medication in the past month. Antibiotic details as reported by the patient (free text): rocky/clauv and cefdinir both for a stubborn ear infection   Blossmo does not get yeast infections when she takes antibiotics.   Blossom needs a return to work/school note.   Weight: 230 lbs   Blossom does not smoke or use smokeless tobacco.   She denies pregnancy and denies breastfeeding. She has menstruated in the past month.   Weight: 230 lbs    MEDICATIONS: famotidine oral, metformin oral, venlafaxine oral, Claritin-D 24 Hour oral, Victoza 3-Ori subcutaneous, propranolol oral, albuterol sulfate inhalation, Advair Diskus inhalation, ALLERGIES: Levaquin, morphine, Tessalon Perles  Clinician Response:   Deapatrick Cerrato,  Based on the information provided, you have a viral upper respiratory infection, otherwise known as a cold. Symptoms vary from person to person, but can include sneezing, coughing, a runny nose, sore throat, and headache and range from mild to severe.  Unfortunately, there are no medications that can cure a cold, so treatment is focused on controlling symptoms as much as possible. Most people gradually feel better until symptoms are gone in 1-2 weeks.  Based on the information provided, you have viral sinusitis, also known as a sinus infection. Sinus infections are caused by bacteria or a virus and symptoms are almost always identical. The difference between the 2 types of infections is timing.  Sinus infections start as viral infections and symptoms improve on their own in about 7 days. If symptoms have not improved after 7 days or have even worsened, a bacterial infection may have developed.  Medication information  Because you have a viral infection, antibiotics will not help you get better. Treating a viral infection with antibiotics could actually make you feel worse.  Unless you are allergic to the over-the-counter medication(s) below, I recommend using:     Dextromethorphan (Robitussin DM or store brand). This medication is a cough suppressant that works by decreasing the feeling of needing to cough. Please follow the instructions on the package.   Over-the-counter medications do not require a prescription. Ask the pharmacist if you have any questions.  Self care  Steps you can take to be as comfortable as possible:     Rest.    Drink plenty of fluids.    Take a warm shower to loosen congestion    Use a cool-mist humidifier.    Use throat lozenges.    Suck on frozen items such as popsicles.    Drink hot tea with lemon and honey.    Gargle with warm salt water (1/4 teaspoon of salt per 8 ounce glass of water).    Take a spoonful of honey to reduce your cough.     When to seek care  Please be seen  in a clinic or urgent care if any of the following occur:   New symptoms develop, or symptoms become worse   Call ahead before going to the clinic or urgent care.  Call 911 or go to the emergency room if you feel that your throat is closing off, you suddenly develop a rash, you are unable to swallow fluids, you are drooling, or you are having difficulty breathing.  COVID-19 (Coronavirus) General Information  Because there is currently no vaccine to prevent infection, the best way to protect yourself is to avoid being exposed to this virus. Common symptoms of COVID-19 include but are not limited to fever, cough, and shortness of breath. These symptoms appear 2-14 days after you are exposed to the virus that causes COVID-19. Click here for more information from the CDC on how to protect yourself.  If you are sick with COVID-19 or suspect you are infected with the virus that causes COVID-19, follow the steps here from the CDC to help prevent the disease from spreading to people in your home and community.  Click here for general information from the CDC on testing.  If you develop any of these emergency warning signs for COVID-19, get medical attention immediately:     Trouble breathing    Persistent pain or pressure in the chest    New confusion or inability to arouse    Bluish lips or face      Call your doctor or clinic before going in. Call 911 if you have a medical emergency and notify the  you have or think you may have COVID-19.  For more detailed and up to date information on COVID-19 (Coronavirus), please visit the CDC website.   Diagnosis: Viral URI  Diagnosis ICD: J06.9

## 2020-04-22 ENCOUNTER — VIRTUAL VISIT (OUTPATIENT)
Dept: PEDIATRICS | Facility: CLINIC | Age: 35
End: 2020-04-22
Payer: COMMERCIAL

## 2020-04-22 ENCOUNTER — NURSE TRIAGE (OUTPATIENT)
Dept: NURSING | Facility: CLINIC | Age: 35
End: 2020-04-22

## 2020-04-22 DIAGNOSIS — R09.81 NASAL CONGESTION: ICD-10-CM

## 2020-04-22 DIAGNOSIS — J45.21 EXACERBATION OF INTERMITTENT ASTHMA, UNSPECIFIED ASTHMA SEVERITY: Primary | ICD-10-CM

## 2020-04-22 DIAGNOSIS — H93.8X1 PLUGGED FEELING IN EAR, RIGHT: ICD-10-CM

## 2020-04-22 DIAGNOSIS — R06.2 WHEEZE: ICD-10-CM

## 2020-04-22 DIAGNOSIS — Z20.822 EXPOSURE TO COVID-19 VIRUS: ICD-10-CM

## 2020-04-22 DIAGNOSIS — R05.9 COUGH: ICD-10-CM

## 2020-04-22 PROBLEM — E28.2 PCOS (POLYCYSTIC OVARIAN SYNDROME): Status: ACTIVE | Noted: 2017-01-03

## 2020-04-22 PROBLEM — J45.20 MILD INTERMITTENT ASTHMA WITHOUT COMPLICATION: Status: ACTIVE | Noted: 2017-01-03

## 2020-04-22 PROCEDURE — 99203 OFFICE O/P NEW LOW 30 MIN: CPT | Mod: 95 | Performed by: NURSE PRACTITIONER

## 2020-04-22 RX ORDER — FLUTICASONE PROPIONATE 50 MCG
1 SPRAY, SUSPENSION (ML) NASAL DAILY
Qty: 11.1 ML | Refills: 0 | Status: SHIPPED | OUTPATIENT
Start: 2020-04-22

## 2020-04-22 RX ORDER — PREDNISONE 20 MG/1
40 TABLET ORAL DAILY
Qty: 10 TABLET | Refills: 0 | Status: SHIPPED | OUTPATIENT
Start: 2020-04-22 | End: 2020-04-27

## 2020-04-22 RX ORDER — ALBUTEROL SULFATE 90 UG/1
1-2 AEROSOL, METERED RESPIRATORY (INHALATION)
COMMUNITY
Start: 2018-10-25

## 2020-04-22 RX ORDER — AZITHROMYCIN 250 MG/1
TABLET, FILM COATED ORAL
Qty: 6 TABLET | Refills: 0 | Status: SHIPPED | OUTPATIENT
Start: 2020-04-22 | End: 2020-04-27

## 2020-04-22 RX ORDER — PROPRANOLOL HCL 60 MG
60 CAPSULE, EXTENDED RELEASE 24HR ORAL
COMMUNITY
Start: 2019-12-12

## 2020-04-22 NOTE — PROGRESS NOTES
"Blossom Hale is a 34 year old female who is being evaluated via a billable telephone visit.      The patient has been notified of following:     \"This telephone visit will be conducted via a call between you and your physician/provider. We have found that certain health care needs can be provided without the need for a physical exam.  This service lets us provide the care you need with a short phone conversation.  If a prescription is necessary we can send it directly to your pharmacy.  If lab work is needed we can place an order for that and you can then stop by our lab to have the test done at a later time.    Telephone visits are billed at different rates depending on your insurance coverage. During this emergency period, for some insurers they may be billed the same as an in-person visit.  Please reach out to your insurance provider with any questions.    If during the course of the call the physician/provider feels a telephone visit is not appropriate, you will not be charged for this service.\"    Patient has given verbal consent for Telephone visit?  Yes    How would you like to obtain your AVS? E-Mail (inform patient AVS not encrypted)    Subjective   Patient is new to the clinic. Hx of asthma, seasonal allergies, PCOS, depression/anxiety. She is a non-smoker.    HPI  Acute Illness   Acute illness concerns: ongoing cough   Onset: not sure - had sinus inf in march (improved after that), now with symptoms 5-6 days    Fever: no - feels feverish    Chills/Sweats: YES- both    Headache (location?): YES    Sinus Pressure:no    Conjunctivitis:  no    Ear Pain: YES: fluid - right ear (feels full, no pain)    Rhinorrhea: no     Congestion: YES- nose     Sore Throat: YES- PND and sore throat comes and goes     Cough: YES-non-productive    Wheeze: YES- and SOB     Decreased Appetite: YES    Nausea: no    Vomiting: YES - Sunday     Diarrhea:  no     Dysuria/Freq.: no     Fatigue/Achiness: YES- both    Sick/Strep " Exposure: YES- works on pharm. Exposed to COVID  14 days ago     Therapies Tried and outcome: antibiotics x2 10 days, tylenol doesn't help    GIven Augmentin 3/17/20 for sinusitis and ear infection, ended up taking Cefdinir for ongoing ear symptoms.  Reports symptoms improved after that but now has cough, nasal congestion, rhinorrhea, chills (no documented fever), fatigue, and headache. She has hx of asthma and notes last night she was feeling slightly short of breath with wheeze even at rest. Albuterol inhaler was helpful. This morning she feels her breathing is somewhat improved and now notes mild SOB and wheeze only with activity. Right ear feels plugged but no pain.  Checked temp during visit and it was 97.1, normal yesterday as well.  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  She is off work this week until 4/27.    Reviewed and updated as needed this visit by Provider  Meds  Problems         Review of Systems   ROS COMP: Constitutional, HEENT, cardiovascular, pulmonary, gi and gu systems are negative, except as otherwise noted.       Objective   Reported vitals:  There were no vitals taken for this visit.   PSYCH: Alert and oriented times 3; coherent speech, normal   rate and volume, able to articulate logical thoughts, able   to abstract reason, no tangential thoughts, no hallucinations   or delusions  Her affect is normal  RESP: No cough, no audible wheezing, able to talk in full sentences  Remainder of exam unable to be completed due to telephone visits    Diagnostic Test Results:  none         Assessment/Plan:  1. Exacerbation of intermittent asthma, unspecified asthma severity  2. Cough  3. Wheeze  Pt reports symptoms did improve with albuterol inhaler last evening, continues to have symtpoms today only with activity. Reassured on the phone she is able to speak in full sentences without audible signs of resp  distress/cough/wheeze. Plan to increase use of albuterol inhaler (or neb) to 3-4 times daily, if she does not notice improvement of symptoms in 1-2 days then she was instructed to start prednisone. If symptoms do not improve 1-2 days after prednisone then would start abx. Do feel most likely viral illness causing exacerbation of asthma, discussed with pt that she does not meet current criteria for COVID-19 testing but can't rule out as possible diagnosis so she was given the self-quarantine instructions and s/s to watch for when to seek emergency care.  - predniSONE (DELTASONE) 20 MG tablet; Take 2 tablets (40 mg) by mouth daily for 5 days  Dispense: 10 tablet; Refill: 0  - azithromycin (ZITHROMAX) 250 MG tablet; Take 2 tablets (500 mg) by mouth daily for 1 day, THEN 1 tablet (250 mg) daily for 4 days.  Dispense: 6 tablet; Refill: 0    4. Plugged feeling in ear, right  5. Nasal congestion  - fluticasone (FLONASE) 50 MCG/ACT nasal spray; Spray 1 spray into both nostrils daily  Dispense: 11.1 mL; Refill: 0    6. Exposure to Covid-19 Virus  Regardless of if you have been tested or not:  Patient who have symptoms (cough, fever, or shortness of breath), need to isolate for 7 days from when symptoms started AND 72 hours after fever resolves (without fever reducing medications) AND improvement of respiratory symptoms (whichever is longer).      Isolate yourself at home (in own room/own bathroom if possible)    Do Not allow any visitors    Do Not go to work or school    Do Not go to [employer],  centers, shopping, or other public places.    Do Not shake hands.    Avoid close and intimate contact with others (hugging, kissing).    Follow CDC recommendations for household cleaning of frequently touched services.     After the initial 7 days, continue to isolate yourself from household members as much as possible. To continue decrease the risk of community spread and exposure, you and any members of your household should  limit activities in public for 14 days after starting home isolation.     You can reference the following CDC link for helpful home isolation/care tips:  https://www.cdc.gov/coronavirus/2019-ncov/downloads/10Things.pdf    Protect Others:    Cover Your Mouth and Nose with a mask, disposable tissue or wash cloth to avoid spreading germs to others.    Wash your hands and face frequently with soap and water    Call Back If: Breathing difficulty develops or you become worse.    For more information about COVID19 and options for caring for yourself at home, please visit the CDC website at https://www.cdc.gov/coronavirus/2019-ncov/about/steps-when-sick.html  For more options for care at [facility], please visit our website at [email or web address]    For more information, please use the [place] Department of Health COVID-19 Website: [email or web address].[email or web address]./diseases/coronavirus/index.html  [place] Department of Health ([facility]) COVID-19 Hotlines (Interpreters available):      Health questions: Phone Number: [phone] or 1-444.594.9830 and Hours: 7 a.m. to 7 p.m.    Schools and  questions: Phone Number: [phone] or [phone] and Hours 7 a.m. to 7 p.m.      Return in about 4 days (around 4/26/2020), or if symptoms worsen or fail to improve.    Phone call duration:  26 minutes    WARD Cole, CNP

## 2020-04-22 NOTE — TELEPHONE ENCOUNTER
COVID 19 Nurse Triage Plan/Patient Instructions    Please be aware that novel coronavirus (COVID-19) may be circulating in the community. If you develop symptoms such as fever, cough, or SOB or if you have concerns about the presence of another infection including coronavirus (COVID-19), please contact your health care provider or visit [email or web address].     Disposition/Instructions    Patient to have scheduled Telephone Visit with a provider. Follow System Ambulatory Workflow for COVID 19.     The clinic staff will assist you to schedule an appointment to complete the Telephone Visit with a provider during normal clinic hours.       Call Back If: Your symptoms worsen before you are able to complete your Telephone Visit with a provider.    Thank you for limiting contact with others, wearing a simple mask to cover your cough, practice good hand hygiene habits and accessing our virtual services where possible to limit the spread of this virus.    For more information about COVID19 and options for caring for yourself at home, please visit the CDC website at https://www.cdc.gov/coronavirus/2019-ncov/about/steps-when-sick.html  For more options for care at [facility], please visit our website at [email or web address]    For more information, please use the [place] Department of Health COVID-19 Website: [email or web address]/diseases/[email or web address]/index.html  [place] Department of Health ([facility]) COVID-19 Hotlines (Interpreters available):      Health questions: Phone Number: [phone] or [phone] and Hours: 7 a.m. to 7 p.m.    Schools and  questions: Phone Number: [phone] or [phone] and Hours 7 a.m. to 7 p.m.      She has had a cough for over one week now. She was on [employer] and they said she had a respiratory infection. She wants a telephone visit instead today and she wants to be tested. I explained that [place] is only testing hospitalized  patients at this time. I suggested she contact the [facility]. She said she's already done that. I connected with scheduling for a telephone visit today. Her shortness of breath with walking, started yesterday.  Tali Godwin RN-[facility]              Reason for Disposition    MILD difficulty breathing (e.g., minimal/no SOB at rest, SOB with walking, pulse <100)    Additional Information    Negative: SEVERE difficulty breathing (e.g., struggling for each breath, speaks in single words)    Negative: Difficult to awaken or acting confused (e.g., disoriented, slurred speech)    Negative: Bluish (or gray) lips or face now    Negative: Shock suspected (e.g., cold/pale/clammy skin, too weak to stand, low BP, rapid pulse)    Negative: Sounds like a life-threatening emergency to the triager    Negative: [1] COVID-19 suspected (e.g., cough, fever, shortness of breath) AND [2] public health department recommends testing    Negative: [1] COVID-19 exposure AND [2] no symptoms    Negative: COVID-19 and Breastfeeding, questions about    Negative: MODERATE difficulty breathing (e.g., speaks in phrases, SOB even at rest, pulse 100-120)    Negative: SEVERE or constant chest pain (Exception: mild central chest pain, present only when coughing)    Negative: Patient sounds very sick or weak to the triager    Protocols used: CORONAVIRUS (COVID-19) DIAGNOSED OR [id number] 3.30.20

## 2020-12-06 ENCOUNTER — HEALTH MAINTENANCE LETTER (OUTPATIENT)
Age: 35
End: 2020-12-06

## 2021-04-10 ENCOUNTER — HEALTH MAINTENANCE LETTER (OUTPATIENT)
Age: 36
End: 2021-04-10

## 2021-05-25 ENCOUNTER — RECORDS - HEALTHEAST (OUTPATIENT)
Dept: ADMINISTRATIVE | Facility: CLINIC | Age: 36
End: 2021-05-25

## 2021-05-26 ENCOUNTER — RECORDS - HEALTHEAST (OUTPATIENT)
Dept: ADMINISTRATIVE | Facility: CLINIC | Age: 36
End: 2021-05-26

## 2021-05-27 ENCOUNTER — RECORDS - HEALTHEAST (OUTPATIENT)
Dept: ADMINISTRATIVE | Facility: CLINIC | Age: 36
End: 2021-05-27

## 2021-05-29 ENCOUNTER — RECORDS - HEALTHEAST (OUTPATIENT)
Dept: ADMINISTRATIVE | Facility: CLINIC | Age: 36
End: 2021-05-29

## 2021-07-14 ENCOUNTER — HOSPITAL ENCOUNTER (EMERGENCY)
Facility: CLINIC | Age: 36
Discharge: HOME | End: 2021-07-14
Attending: EMERGENCY MEDICINE | Admitting: EMERGENCY MEDICINE
Payer: COMMERCIAL

## 2021-07-14 VITALS
WEIGHT: 235 LBS | OXYGEN SATURATION: 97 % | RESPIRATION RATE: 16 BRPM | DIASTOLIC BLOOD PRESSURE: 68 MMHG | SYSTOLIC BLOOD PRESSURE: 134 MMHG | BODY MASS INDEX: 43.24 KG/M2 | HEART RATE: 101 BPM | HEIGHT: 62 IN | TEMPERATURE: 98.5 F

## 2021-07-14 DIAGNOSIS — B02.30 HERPES ZOSTER OPHTHALMICUS OF LEFT EYE: ICD-10-CM

## 2021-07-14 PROCEDURE — 96372 THER/PROPH/DIAG INJ SC/IM: CPT | Performed by: EMERGENCY MEDICINE

## 2021-07-14 PROCEDURE — 250N000011 HC RX IP 250 OP 636: Performed by: EMERGENCY MEDICINE

## 2021-07-14 PROCEDURE — 250N000009 HC RX 250: Performed by: EMERGENCY MEDICINE

## 2021-07-14 PROCEDURE — 99284 EMERGENCY DEPT VISIT MOD MDM: CPT | Mod: 25

## 2021-07-14 RX ORDER — KETOROLAC TROMETHAMINE 30 MG/ML
30 INJECTION, SOLUTION INTRAMUSCULAR; INTRAVENOUS ONCE
Status: COMPLETED | OUTPATIENT
Start: 2021-07-14 | End: 2021-07-14

## 2021-07-14 RX ORDER — TETRACAINE HYDROCHLORIDE 5 MG/ML
1-2 SOLUTION OPHTHALMIC ONCE
Status: COMPLETED | OUTPATIENT
Start: 2021-07-14 | End: 2021-07-14

## 2021-07-14 RX ORDER — DEXAMETHASONE SODIUM PHOSPHATE 10 MG/ML
10 INJECTION INTRAMUSCULAR; INTRAVENOUS ONCE
Status: COMPLETED | OUTPATIENT
Start: 2021-07-14 | End: 2021-07-14

## 2021-07-14 RX ADMIN — DEXAMETHASONE SODIUM PHOSPHATE 10 MG: 10 INJECTION INTRAMUSCULAR; INTRAVENOUS at 21:51

## 2021-07-14 RX ADMIN — KETOROLAC TROMETHAMINE 30 MG: 30 INJECTION, SOLUTION INTRAMUSCULAR; INTRAVENOUS at 21:52

## 2021-07-14 RX ADMIN — TETRACAINE HYDROCHLORIDE 2 DROP: 5 SOLUTION OPHTHALMIC at 21:54

## 2021-07-14 RX ADMIN — FLUORESCEIN SODIUM 600 MCG: 1 STRIP OPHTHALMIC at 21:54

## 2021-07-14 ASSESSMENT — MIFFLIN-ST. JEOR: SCORE: 1709.2

## 2021-07-14 ASSESSMENT — ENCOUNTER SYMPTOMS
PHOTOPHOBIA: 1
NECK PAIN: 1
FACIAL SWELLING: 1

## 2021-07-14 NOTE — Clinical Note
Blossom Hale was seen and treated in our emergency department on 7/14/2021.  She may return to work on 07/19/2021.       If you have any questions or concerns, please don't hesitate to call.      Cody Do MD

## 2021-07-15 ASSESSMENT — ENCOUNTER SYMPTOMS
SEIZURES: 0
FEVER: 0
CHILLS: 0
TREMORS: 0
NUMBNESS: 0
EYE DISCHARGE: 1
EYE REDNESS: 1
WEAKNESS: 0
FACIAL ASYMMETRY: 0
DIZZINESS: 0
SPEECH DIFFICULTY: 0
HEADACHES: 0
EYE PAIN: 1
LIGHT-HEADEDNESS: 0
EYE ITCHING: 0

## 2021-07-15 NOTE — ED TRIAGE NOTES
Dr Schneider at [facility] in [place] dx shingles yesterday.  Lesions are on patient's left side of face/head scalp and ears.  Was given norco for pain and not helping.    Eye doctor has reviewed patient 7/13.    Patient coming in for pian, and swelling.    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     and daughter at the bedside

## 2021-07-15 NOTE — DISCHARGE INSTRUCTIONS
1.  You can take up to 6 Norco tablets a day for pain.  You should continue the naproxen twice a day to help with inflammation.  This will help shrink the lymph nodes in your neck.  Additionally, you have been given an injection of steroids to help with this    2.  Call your follow-up eye doctor tomorrow to notify them of your visit to ensure that they do not want to place you on a course of oral steroids or change your treatment otherwise.  This should not be done from the emergency department, but should be done in discussion with your eye doctor.    3.  If you experience any new or concerning symptoms, return to the emergency department

## 2021-09-25 ENCOUNTER — HEALTH MAINTENANCE LETTER (OUTPATIENT)
Age: 36
End: 2021-09-25

## 2021-10-20 ENCOUNTER — APPOINTMENT (OUTPATIENT)
Dept: URBAN - METROPOLITAN AREA CLINIC 260 | Age: 36
Setting detail: DERMATOLOGY
End: 2021-10-25

## 2021-10-20 VITALS — WEIGHT: 220 LBS | HEIGHT: 62 IN | RESPIRATION RATE: 17 BRPM

## 2021-10-20 DIAGNOSIS — D18.0 HEMANGIOMA: ICD-10-CM

## 2021-10-20 DIAGNOSIS — L82.0 INFLAMED SEBORRHEIC KERATOSIS: ICD-10-CM

## 2021-10-20 DIAGNOSIS — L91.8 OTHER HYPERTROPHIC DISORDERS OF THE SKIN: ICD-10-CM

## 2021-10-20 DIAGNOSIS — L90.5 SCAR CONDITIONS AND FIBROSIS OF SKIN: ICD-10-CM

## 2021-10-20 DIAGNOSIS — D22 MELANOCYTIC NEVI: ICD-10-CM

## 2021-10-20 DIAGNOSIS — D49.2 NEOPLASM OF UNSPECIFIED BEHAVIOR OF BONE, SOFT TISSUE, AND SKIN: ICD-10-CM

## 2021-10-20 DIAGNOSIS — Z85.828 PERSONAL HISTORY OF OTHER MALIGNANT NEOPLASM OF SKIN: ICD-10-CM

## 2021-10-20 DIAGNOSIS — Z71.89 OTHER SPECIFIED COUNSELING: ICD-10-CM

## 2021-10-20 PROBLEM — D22.5 MELANOCYTIC NEVI OF TRUNK: Status: ACTIVE | Noted: 2021-10-20

## 2021-10-20 PROBLEM — D18.01 HEMANGIOMA OF SKIN AND SUBCUTANEOUS TISSUE: Status: ACTIVE | Noted: 2021-10-20

## 2021-10-20 PROCEDURE — 99203 OFFICE O/P NEW LOW 30 MIN: CPT | Mod: 25

## 2021-10-20 PROCEDURE — OTHER LIQUID NITROGEN: OTHER

## 2021-10-20 PROCEDURE — OTHER COUNSELING: OTHER

## 2021-10-20 PROCEDURE — OTHER BIOPSY BY SHAVE METHOD: OTHER

## 2021-10-20 PROCEDURE — 11102 TANGNTL BX SKIN SINGLE LES: CPT | Mod: 59

## 2021-10-20 PROCEDURE — 17110 DESTRUCT B9 LESION 1-14: CPT

## 2021-10-20 PROCEDURE — OTHER PRESCRIPTION MEDICATION MANAGEMENT: OTHER

## 2021-10-20 ASSESSMENT — LOCATION DETAILED DESCRIPTION DERM
LOCATION DETAILED: RIGHT AXILLARY VAULT
LOCATION DETAILED: LEFT CENTRAL FRONTAL SCALP
LOCATION DETAILED: INFERIOR THORACIC SPINE
LOCATION DETAILED: RIGHT MID-UPPER BACK
LOCATION DETAILED: RIGHT CENTRAL EYEBROW
LOCATION DETAILED: LEFT CENTRAL EYEBROW

## 2021-10-20 ASSESSMENT — LOCATION SIMPLE DESCRIPTION DERM
LOCATION SIMPLE: LEFT EYEBROW
LOCATION SIMPLE: UPPER BACK
LOCATION SIMPLE: RIGHT EYEBROW
LOCATION SIMPLE: RIGHT AXILLARY VAULT
LOCATION SIMPLE: SCALP
LOCATION SIMPLE: RIGHT UPPER BACK

## 2021-10-20 ASSESSMENT — LOCATION ZONE DERM
LOCATION ZONE: SCALP
LOCATION ZONE: TRUNK
LOCATION ZONE: FACE
LOCATION ZONE: AXILLAE

## 2021-10-20 NOTE — PROCEDURE: COUNSELING
Detail Level: Detailed
Nicotinamide Supplementation Recommendations: 500mg bid
Detail Level: Zone
Detail Level: Generalized

## 2021-10-20 NOTE — PROCEDURE: LIQUID NITROGEN
Show Aperture Variable?: Yes
Medical Necessity Information: It is in your best interest to select a reason for this procedure from the list below. All of these items fulfill various CMS LCD requirements except the new and changing color options.
Include Z78.9 (Other Specified Conditions Influencing Health Status) As An Associated Diagnosis?: No
Detail Level: Detailed
Duration Of Freeze Thaw-Cycle (Seconds): 5-10
Number Of Freeze-Thaw Cycles: 2 freeze-thaw cycles
Consent: The patient's consent was obtained including but not limited to risks of crusting, scabbing, blistering, scarring, darker or lighter pigmentary change, recurrence, incomplete removal and infection.
Post-Care Instructions: I reviewed with the patient in detail post-care instructions. Patient is to wear sunprotection, and avoid picking at any of the treated lesions. Pt may apply Vaseline to crusted or scabbing areas.
Medical Necessity Clause: This procedure was medically necessary because the lesions that were treated were:

## 2021-10-20 NOTE — PROCEDURE: BIOPSY BY SHAVE METHOD
Biopsy Method: Dermablade
Validate Anticipated Plan: No
Additional Anesthesia Volume In Cc (Will Not Render If 0): 0
Information: Selecting Yes will display possible errors in your note based on the variables you have selected. This validation is only offered as a suggestion for you. PLEASE NOTE THAT THE VALIDATION TEXT WILL BE REMOVED WHEN YOU FINALIZE YOUR NOTE. IF YOU WANT TO FAX A PRELIMINARY NOTE YOU WILL NEED TO TOGGLE THIS TO 'NO' IF YOU DO NOT WANT IT IN YOUR FAXED NOTE.
Depth Of Biopsy: dermis
Hemostasis: Drysol
Silver Nitrate Text: The wound bed was treated with silver nitrate after the biopsy was performed.
Billing Type: Third-Party Bill
Electrodesiccation And Curettage Text: The wound bed was treated with electrodesiccation and curettage after the biopsy was performed.
Biopsy Type: H and E
Type Of Destruction Used: Curettage
Was A Bandage Applied: Yes
Electrodesiccation Text: The wound bed was treated with electrodesiccation after the biopsy was performed.
Wound Care: Petrolatum
Notification Instructions: Patient will be notified of biopsy results. However, patient instructed to call the office if not contacted within 2 weeks.
Consent: Written consent was obtained and risks were reviewed including but not limited to scarring, infection, bleeding, scabbing, incomplete removal, nerve damage and allergy to anesthesia.
Cryotherapy Text: The wound bed was treated with cryotherapy after the biopsy was performed.
Curettage Text: The wound bed was treated with curettage after the biopsy was performed.
Post-Care Instructions: I reviewed with the patient in detail post-care instructions. Patient is to keep the biopsy site dry overnight, and then apply bacitracin twice daily until healed. Patient may apply hydrogen peroxide soaks to remove any crusting.
Dressing: bandage
Anesthesia Volume In Cc (Will Not Render If 0): 0.3
Anesthesia Type: 1% lidocaine with epinephrine
Detail Level: Detailed

## 2022-05-07 ENCOUNTER — HEALTH MAINTENANCE LETTER (OUTPATIENT)
Age: 37
End: 2022-05-07

## 2022-12-26 ENCOUNTER — HEALTH MAINTENANCE LETTER (OUTPATIENT)
Age: 37
End: 2022-12-26

## 2023-01-12 ENCOUNTER — APPOINTMENT (OUTPATIENT)
Dept: URBAN - METROPOLITAN AREA CLINIC 260 | Age: 38
Setting detail: DERMATOLOGY
End: 2023-01-13

## 2023-01-12 VITALS — WEIGHT: 205 LBS | HEIGHT: 62 IN

## 2023-01-12 DIAGNOSIS — D18.0 HEMANGIOMA: ICD-10-CM

## 2023-01-12 DIAGNOSIS — L71.8 OTHER ROSACEA: ICD-10-CM

## 2023-01-12 DIAGNOSIS — L81.4 OTHER MELANIN HYPERPIGMENTATION: ICD-10-CM

## 2023-01-12 DIAGNOSIS — L91.8 OTHER HYPERTROPHIC DISORDERS OF THE SKIN: ICD-10-CM

## 2023-01-12 DIAGNOSIS — D22 MELANOCYTIC NEVI: ICD-10-CM

## 2023-01-12 DIAGNOSIS — Z71.89 OTHER SPECIFIED COUNSELING: ICD-10-CM

## 2023-01-12 DIAGNOSIS — L82.1 OTHER SEBORRHEIC KERATOSIS: ICD-10-CM

## 2023-01-12 DIAGNOSIS — L28.1 PRURIGO NODULARIS: ICD-10-CM

## 2023-01-12 DIAGNOSIS — L70.0 ACNE VULGARIS: ICD-10-CM

## 2023-01-12 PROBLEM — D22.71 MELANOCYTIC NEVI OF RIGHT LOWER LIMB, INCLUDING HIP: Status: ACTIVE | Noted: 2023-01-12

## 2023-01-12 PROBLEM — D22.62 MELANOCYTIC NEVI OF LEFT UPPER LIMB, INCLUDING SHOULDER: Status: ACTIVE | Noted: 2023-01-12

## 2023-01-12 PROBLEM — D18.01 HEMANGIOMA OF SKIN AND SUBCUTANEOUS TISSUE: Status: ACTIVE | Noted: 2023-01-12

## 2023-01-12 PROBLEM — D22.72 MELANOCYTIC NEVI OF LEFT LOWER LIMB, INCLUDING HIP: Status: ACTIVE | Noted: 2023-01-12

## 2023-01-12 PROBLEM — D22.5 MELANOCYTIC NEVI OF TRUNK: Status: ACTIVE | Noted: 2023-01-12

## 2023-01-12 PROCEDURE — OTHER MIPS QUALITY: OTHER

## 2023-01-12 PROCEDURE — OTHER COUNSELING: OTHER

## 2023-01-12 PROCEDURE — OTHER ADDITIONAL NOTES: OTHER

## 2023-01-12 PROCEDURE — 99214 OFFICE O/P EST MOD 30 MIN: CPT | Mod: 25

## 2023-01-12 PROCEDURE — 17110 DESTRUCT B9 LESION 1-14: CPT

## 2023-01-12 PROCEDURE — OTHER PRESCRIPTION: OTHER

## 2023-01-12 PROCEDURE — OTHER PRESCRIPTION MEDICATION MANAGEMENT: OTHER

## 2023-01-12 PROCEDURE — OTHER BENIGN DESTRUCTION: OTHER

## 2023-01-12 RX ORDER — METRONIDAZOLE 7.5 MG/G
CREAM TOPICAL BID
Qty: 45 | Refills: 4 | Status: ERX | COMMUNITY
Start: 2023-01-12

## 2023-01-12 RX ORDER — TRETIONIN 0.25 MG/G
CREAM TOPICAL QHS
Qty: 45 | Refills: 3 | Status: ERX | COMMUNITY
Start: 2023-01-12

## 2023-01-12 ASSESSMENT — LOCATION SIMPLE DESCRIPTION DERM
LOCATION SIMPLE: UPPER BACK
LOCATION SIMPLE: LEFT FOOT
LOCATION SIMPLE: LEFT POSTERIOR UPPER ARM
LOCATION SIMPLE: RIGHT CHEEK
LOCATION SIMPLE: CHEST
LOCATION SIMPLE: LEFT CHEEK
LOCATION SIMPLE: RIGHT 5TH TOE
LOCATION SIMPLE: LEFT THIGH
LOCATION SIMPLE: SCALP

## 2023-01-12 ASSESSMENT — LOCATION DETAILED DESCRIPTION DERM
LOCATION DETAILED: LEFT MEDIAL DORSAL FOOT
LOCATION DETAILED: RIGHT MEDIAL 5TH TOE
LOCATION DETAILED: STERNUM
LOCATION DETAILED: LEFT CENTRAL MALAR CHEEK
LOCATION DETAILED: LEFT ANTERIOR MEDIAL PROXIMAL THIGH
LOCATION DETAILED: LEFT DISTAL POSTERIOR UPPER ARM
LOCATION DETAILED: INFERIOR THORACIC SPINE
LOCATION DETAILED: LEFT CENTRAL FRONTAL SCALP
LOCATION DETAILED: RIGHT CENTRAL MALAR CHEEK

## 2023-01-12 ASSESSMENT — LOCATION ZONE DERM
LOCATION ZONE: SCALP
LOCATION ZONE: TOE
LOCATION ZONE: TRUNK
LOCATION ZONE: FEET
LOCATION ZONE: FACE
LOCATION ZONE: LEG
LOCATION ZONE: ARM

## 2023-01-12 NOTE — PROCEDURE: ADDITIONAL NOTES
Detail Level: Detailed
Additional Notes: Patient had bx’d in the past but results came back benign
Render Risk Assessment In Note?: no

## 2023-01-12 NOTE — PROCEDURE: BENIGN DESTRUCTION
Patient Name: Scott Diego  : 4/10/1931    MRN: [id number]                              Today's Date: 3/12/2022       Admit Date: 3/3/2022    Visit Dx:     ICD-10-CM ICD-9-CM   1. CARYN (acute kidney injury) (HCC)  N17.9 584.9   2. Elevated serum creatinine  R79.89 790.99   3. Normocytic anemia  D64.9 285.9   4. Impaired functional mobility, balance, gait, and endurance  Z74.09 V49.89   5. Oral phase dysphagia  R13.11 787.21     Patient Active Problem List   Diagnosis   • Idiopathic peripheral neuropathy   • Neck pain   • Mild cognitive impairment   • Anemia   • Hyponatremia   • Hypertension   • Dehydration   • History of CVA (cerebrovascular accident)   • Dementia (HCC)   • Left bundle branch block   • Orthostatic dizziness   • Chronic systolic heart failure (HCC)   • Normocytic anemia   • CARYN (acute kidney injury) (HCC)   • Dyspnea   • Hypoxia   • Hypothyroid   • Pericardial effusion   • Acute respiratory failure with hypoxia (HCC)   • Right lower lobe pneumonia   • Ventricular ectopy   • Elevated serum creatinine     Past Medical History:   Diagnosis Date   • Congestive heart failure (HCC)    • Malignant neoplasm (HCC)    • Stroke (HCC)     mini stroke   • Systolic heart failure (HCC) 2017    Chronic/compensated (EF 25%)     Past Surgical History:   Procedure Laterality Date   • CHOLECYSTECTOMY     • EYE SURGERY     • HYSTERECTOMY        General Information     Row Name 22 1540          OT Time and Intention    Document Type therapy note (daily note)  -KF     Mode of Treatment occupational therapy  -KF     Row Name 22 1545          General Information    Patient Profile Reviewed yes  -KF     Existing Precautions/Restrictions fall  -KF     Barriers to Rehab previous functional deficit;cognitive status  -KF     Row Name 22 1547          Cognition    Orientation Status (Cognition) oriented to;person;disoriented to;place;situation;time  -KF     Row Name 22 0938          Safety Issues, 
Functional Mobility    Safety Issues Affecting Function (Mobility) insight into deficits/self-awareness;awareness of need for assistance;safety precaution awareness  -     Impairments Affecting Function (Mobility) balance;coordination;visual/perceptual;endurance/activity tolerance;strength;cognition  -KF     Cognitive Impairments, Mobility Safety/Performance attention;awareness, need for assistance;insight into deficits/self-awareness  -KF           User Key  (r) = Recorded By, (t) = Taken By, (c) = Cosigned By    Initials Name Provider Type    KF Theresa Rader OT Occupational Therapist                 Mobility/ADL's     Row Name 03/12/22 1541          Bed Mobility    Comment, (Bed Mobility) UIC upon arrival  -     Row Name 03/12/22 1541          Transfers    Transfers sit-stand transfer  -     Comment, (Transfers) min VC's for seq  -KF     Sit-Stand [place] (Transfers) standby assist  intermittent HHA  -     Stand-Sit [place] (Transfers) standby assist  -     Row Name 03/12/22 1541          Functional Mobility    Functional Mobility- Ind. Level contact guard assist;verbal cues required  -     Functional Mobility-Distance (Feet) 80  40+40  -KF     Functional Mobility- Safety Issues weight-shifting ability decreased;sequencing ability decreased  -     Functional Mobility- Comment IV pole for support 40 ft then intermittent HHA 40ft post seated rest break  -     Row Name 03/12/22 1541          Activities of Daily Living    BADL Assessment/Intervention upper body dressing;lower body dressing;grooming  -[facility] Name 03/12/22 1541          Upper Body Dressing Assessment/Training    [place] Level (Upper Body Dressing) don;front opening garment;minimum assist (75% patient effort)  -     Position (Upper Body Dressing) unsupported sitting  -     Row Name 03/12/22 1541          Lower Body Dressing Assessment/Training    [place] Level (Lower Body Dressing) don;set up;socks  -  
   Position (Lower Body Dressing) unsupported sitting  -KF     Row Name 03/12/22 1541          Grooming Assessment/Training    [place] Level (Grooming) wash face, hands;set up  -KF     Position (Grooming) unsupported sitting  -KF           User Key  (r) = Recorded By, (t) = Taken By, (c) = Cosigned By    Initials Name Provider Type    Theresa Solares OT Occupational Therapist               Obj/Interventions     Row Name 03/12/22 1545          Shoulder (Therapeutic Exercise)    Shoulder (Therapeutic Exercise) AROM (active range of motion)  -KF     Shoulder AROM (Therapeutic Exercise) bilateral;flexion;extension;horizontal aBduction/aDduction;10 repetitions;scapular protraction;standing  -KF     Row Name 03/12/22 1545          Elbow/Forearm (Therapeutic Exercise)    Elbow/Forearm (Therapeutic Exercise) AROM (active range of motion)  -KF     Elbow/Forearm AROM (Therapeutic Exercise) bilateral;flexion;extension;10 repetitions;standing  -KF     Row Name 03/12/22 1545          Motor Skills    Therapeutic Exercise shoulder;elbow/forearm  -KF     [facility] Name 03/12/22 1545          Balance    Balance Assessment standing static balance;standing dynamic balance  -KF     Static Standing Balance contact guard  -KF     Dynamic Standing Balance minimal assist;contact guard  -KF     Position/Device Used, Standing Balance unsupported  -KF     Balance Interventions standing;sit to stand;minimal challenge;UE activity with balance activity;weight shifting activity  -KF     Comment, Balance no LOB, inter HHA and IV pole for support, good tolerance for standing BUE ther ex  -KF           User Key  (r) = Recorded By, (t) = Taken By, (c) = Cosigned By    Initials Name Provider Type    Theresa Solares OT Occupational Therapist               Goals/Plan     Row Name 03/12/22 1549          Transfer Goal 1 (OT)    Activity/Assistive Device (Transfer Goal 1, OT) toilet  -KF     [place] Level/Cues Needed (Transfer Goal 1, OT) 
Medical Necessity Information: It is in your best interest to select a reason for this procedure from the list below. All of these items fulfill various CMS LCD requirements except the new and changing color options.
standby assist  -KF     Time Frame (Transfer Goal 1, OT) long term goal (LTG);10 days  -KF     Progress/Outcome (Transfer Goal 1, OT) good progress toward goal  -KF     Row Name 03/12/22 1549          Toileting Goal 1 (OT)    Activity/Device (Toileting Goal 1, OT) adjust/manage clothing;perform perineal hygiene;commode;grab bar/safety frame  -KF     [place] Level/Cues Needed (Toileting Goal 1, OT) supervision required;verbal cues required  -KF     Time Frame (Toileting Goal 1, OT) long term goal (LTG);10 days  -KF     Progress/Outcome (Toileting Goal 1, OT) good progress toward goal  -KF     Row Name 03/12/22 1549          Grooming Goal 1 (OT)    Activity/Device (Grooming Goal 1, OT) hair care;oral care;wash face, hands  -KF     [place] (Grooming Goal 1, OT) standby assist;verbal cues required  -KF     Time Frame (Grooming Goal 1, OT) long term goal (LTG);10 days  -KF     Progress/Outcome (Grooming Goal 1, OT) goal partially met  met for washing hands and hair care  -KF           User Key  (r) = Recorded By, (t) = Taken By, (c) = Cosigned By    Initials Name Provider Type    KF Theresa Rader, OT Occupational Therapist               Clinical Impression     Row Name 03/12/22 1541          Pain Assessment    Pretreatment Pain Rating 0/10 - no pain  -KF     Posttreatment Pain Rating 0/10 - no pain  -KF     Pre/Posttreatment Pain Comment tolerated  -KF     Pain Intervention(s) Repositioned;Ambulation/increased activity  -KF     Row Name 03/12/22 1547          Plan of Care Review    Plan of Care Reviewed With patient;family  -KF     Progress improving  -KF     Outcome Evaluation Pt completed functional mob with IV pole support 40ft Aron then no UE support next 40ft with Aron progressing towards moments CGA post seated rest break, tolerated standing BUE ther ex well, cont IPOT per POC  -     Row Name 03/12/22 1540          Therapy Assessment/Plan (OT)    Rehab Potential (OT) good, to achieve stated therapy 
goals  -KF     Criteria for Skilled Therapeutic Interventions Met (OT) yes;meets criteria;skilled treatment is necessary  -KF     Therapy Frequency (OT) daily  -KF     Row Name 03/12/22 1546          Therapy Plan Review/Discharge Plan (OT)    Anticipated Discharge Disposition (OT) skilled nursing facility  -KF     Row Name 03/12/22 1546          Vital Signs    Pre Systolic BP Rehab --  RN cleared VSS  -KF     Pre SpO2 (%) 94  -KF     O2 Delivery Pre Treatment room air  -KF     Pre Patient Position Sitting  -KF     Intra Patient Position Standing  -KF     Post Patient Position Sitting  -KF     Rest Breaks  1  -KF     Row Name 03/12/22 1546          Positioning and Restraints    Pre-Treatment Position sitting in chair/recliner  -KF     Post Treatment Position chair  -KF     In Chair notified nsg;reclined;sitting;call light within reach;encouraged to call for assist;exit alarm on;with family/caregiver;waffle cushion;legs elevated  -KF           User Key  (r) = Recorded By, (t) = Taken By, (c) = Cosigned By    Initials Name Provider Type    KF Theresa Rader, OT Occupational Therapist               Outcome Measures     Row Name 03/12/22 1550          How much help from another is currently needed...    Putting on and taking off regular lower body clothing? 3  -KF     Bathing (including washing, rinsing, and drying) 3  -KF     Toileting (which includes using toilet bed pan or urinal) 3  -KF     Putting on and taking off regular upper body clothing 3  -KF     Taking care of personal grooming (such as brushing teeth) 3  -KF     Eating meals 4  -KF     AM-PAC 6 Clicks Score (OT) 19  -KF     Row Name 03/12/22 1453 03/12/22 1000       How much help from another person do you currently need...    Turning from your back to your side while in flat bed without using bedrails? 4  -AS 4  -KH    Moving from lying on back to sitting on the side of a flat bed without bedrails? 4  -AS 4  -KH    Moving to and from a bed to a chair 
(including a wheelchair)? 3  -AS 3  -KH    Standing up from a chair using your arms (e.g., wheelchair, bedside chair)? 3  -AS 3  -KH    Climbing 3-5 steps with a railing? 2  -AS 3  -KH    To walk in hospital room? 3  -AS 3  -KH    AM-PAC 6 Clicks Score (PT) 19  -AS 20  -KH    Row Name 03/12/22 0800          How much help from another person do you currently need...    Turning from your back to your side while in flat bed without using bedrails? 4  -KH     Moving from lying on back to sitting on the side of a flat bed without bedrails? 4  -KH     Moving to and from a bed to a chair (including a wheelchair)? 3  -KH     Standing up from a chair using your arms (e.g., wheelchair, bedside chair)? 3  -KH     Climbing 3-5 steps with a railing? 3  -KH     To walk in hospital room? 3  -KH     AM-PAC 6 Clicks Score (PT) 20  -KH     Row Name 03/12/22 1550 03/12/22 1453       Functional Assessment    Outcome Measure Options AM-PAC 6 Clicks Daily Activity (OT)  -KF AM-PAC 6 Clicks Basic Mobility (PT)  -AS          User Key  (r) = Recorded By, (t) = Taken By, (c) = Cosigned By    Initials Name Provider Type    AS Jessie Fleming, PTA Physical Therapy Assistant    Stephanie Mathew, RN Registered Nurse    Theresa Solares, OT Occupational Therapist                Occupational Therapy Education                 Title: PT OT SLP Therapies (In Progress)     Topic: Occupational Therapy (Done)     Point: ADL training (Done)     Description:   Instruct learner(s) on proper safety adaptation and remediation techniques during self care or transfers.   Instruct in proper use of assistive devices.              Learning Progress Summary           Patient Acceptance, E,TB,D, NR,VU by  at 3/12/2022 1550    Acceptance, E,TB,D, VU,DU,NR by KF at 3/10/2022 1419    Acceptance, E,TB,D, NR,VU by KF at 3/8/2022 1444    Acceptance, E, VU,NR by AN at 3/7/2022 1544    Acceptance, E,TB, NR by EDMOND at 3/5/2022 0806    Acceptance, E,TB,D, 
Add 52 Modifier (Optional): no
VU,DU,NR by KF at 3/4/2022 0903   Family Acceptance, E,TB,D, NR,VU by KF at 3/12/2022 1550    Acceptance, E,TB,D, NR,VU by KF at 3/8/2022 1444    Acceptance, E, VU,NR by AN at 3/7/2022 1544                   Point: Home exercise program (Done)     Description:   Instruct learner(s) on appropriate technique for monitoring, assisting and/or progressing therapeutic exercises/activities.              Learning Progress Summary           Patient Acceptance, E,TB,D, NR,VU by KF at 3/12/2022 1550    Acceptance, E,TB,D, VU,DU,NR by KF at 3/10/2022 1419    Acceptance, E,TB,D, NR,VU by KF at 3/8/2022 1444    Acceptance, E,TB, NR by EDMOND at 3/5/2022 0806   Family Acceptance, E,TB,D, NR,VU by KF at 3/12/2022 1550    Acceptance, E,TB,D, NR,VU by KF at 3/8/2022 1444                   Point: Precautions (Done)     Description:   Instruct learner(s) on prescribed precautions during self-care and functional transfers.              Learning Progress Summary           Patient Acceptance, E,TB,D, NR,VU by KF at 3/12/2022 1550    Acceptance, E,TB,D, VU,DU,NR by KF at 3/10/2022 1419    Acceptance, E,TB,D, NR,VU by KF at 3/8/2022 1444    Acceptance, E, VU,NR by AN at 3/7/2022 1544    Acceptance, E,TB, NR by EDMOND at 3/5/2022 0806    Acceptance, E,TB,D, VU,DU,NR by KF at 3/4/2022 0903   Family Acceptance, E,TB,D, NR,VU by KF at 3/12/2022 1550    Acceptance, E,TB,D, NR,VU by KF at 3/8/2022 1444    Acceptance, E, VU,NR by AN at 3/7/2022 1544                   Point: Body mechanics (Done)     Description:   Instruct learner(s) on proper positioning and spine alignment during self-care, functional mobility activities and/or exercises.              Learning Progress Summary           Patient Acceptance, E,TB,D, NR,VU by KF at 3/12/2022 1550    Acceptance, E,TB,D, VU,DU,NR by KF at 3/10/2022 1419    Acceptance, E,TB,D, NR,VU by KF at 3/8/2022 1444    Acceptance, E, VU,NR by JENNIFER at 3/7/2022 1544    Acceptance, E,TB, NR by EDMOND at 3/5/2022 0806    
Acceptance, E,TB,D, VU,DU,NR by  at 3/4/2022 0903   Family Acceptance, E,TB,D, NR,VU by  at 3/12/2022 1550    Acceptance, E,TB,D, NR,VU by  at 3/8/2022 1444    Acceptance, E, VU,NR by  at 3/7/2022 1544                               User Key     Initials Effective Dates Name Provider Type Discipline     06/16/21 -  Theresa Rader, OT Occupational Therapist OT    JENNIFER 09/21/21 -  Elissa Bhagat OT Occupational Therapist OT    EDMOND 12/29/21 -  Hannah Anderson RN Registered Nurse Nurse              OT Recommendation and Plan  Planned Therapy Interventions (OT): BADL retraining, adaptive equipment training, activity tolerance training, patient/caregiver education/training, ROM/therapeutic exercise, occupation/activity based interventions, cognitive/visual perception retraining, strengthening exercise, transfer/mobility retraining, functional balance retraining  Therapy Frequency (OT): daily  Plan of Care Review  Plan of Care Reviewed With: patient, family  Progress: improving  Outcome Evaluation: Pt completed functional mob with IV pole support 40ft Aron then no UE support next 40ft with Aron progressing towards moments CGA post seated rest break, tolerated standing BUE ther ex well, cont IPOT per POC     Time Calculation:    Time Calculation- OT     Row Name 03/12/22 1517 03/12/22 1454          Time Calculation- OT    OT Start Time 1517 -KF --     OT Received On 03/12/22  -KF --            Timed Charges    33677 - OT Therapeutic Exercise Minutes 5  -KF --     23903 - Gait Training Minutes  -- 15  -AS     51441 - OT Therapeutic Activity Minutes 10  -KF --            Total Minutes    Timed Charges Total Minutes 15  -KF 15  -AS      Total Minutes 15  -KF 15  -AS           User Key  (r) = Recorded By, (t) = Taken By, (c) = Cosigned By    Initials Name Provider Type    AS Jessie Fleming PTA Physical Therapy Assistant     Theresa Rader, OT Occupational Therapist              Therapy Charges for Today  
   Code Description Service Date Service Provider Modifiers Qty    09188290172  OT THERAPEUTIC ACT EA 15 MIN 3/12/2022 Theresa Rader, OT GO 1               Theresa Rader OT  3/12/2022  
Anesthesia Volume In Cc: 0.5
Medical Necessity Clause: This procedure was medically necessary because the lesions that were treated were:
Post-Care Instructions: I reviewed with the patient in detail post-care instructions. Patient is to wear sunprotection, and avoid picking at any of the treated lesions. Pt may apply Vaseline to crusted or scabbing areas. Keep bandaids on for 24 hours and then wash off with soap and water
Detail Level: Detailed
Consent: The patient's consent was obtained including but not limited to risks of crusting, scabbing, blistering, scarring, darker or lighter pigmentary change, recurrence, incomplete removal and infection.
Treatment Number (Will Not Render If 0): 1

## 2023-01-12 NOTE — PROCEDURE: COUNSELING
Minocycline Pregnancy And Lactation Text: This medication is Pregnancy Category D and not consider safe during pregnancy. It is also excreted in breast milk.
Winlevi Counseling:  I discussed with the patient the risks of topical clascoterone including but not limited to erythema, scaling, itching, and stinging. Patient voiced their understanding.
Tazorac Pregnancy And Lactation Text: This medication is not safe during pregnancy. It is unknown if this medication is excreted in breast milk.
Aklief counseling:  Patient advised to apply a pea-sized amount only at bedtime and wait 30 minutes after washing their face before applying.  If too drying, patient may add a non-comedogenic moisturizer.  The most commonly reported side effects including irritation, redness, scaling, dryness, stinging, burning, itching, and increased risk of sunburn.  The patient verbalized understanding of the proper use and possible adverse effects of retinoids.  All of the patient's questions and concerns were addressed.
Erythromycin Counseling:  I discussed with the patient the risks of erythromycin including but not limited to GI upset, allergic reaction, drug rash, diarrhea, increase in liver enzymes, and yeast infections.
Topical Retinoid Pregnancy And Lactation Text: This medication is Pregnancy Category C. It is unknown if this medication is excreted in breast milk.
Topical Retinoid counseling:  Patient advised to apply a pea-sized amount only at bedtime and wait 30 minutes after washing their face before applying.  If too drying, patient may add a non-comedogenic moisturizer. The patient verbalized understanding of the proper use and possible adverse effects of retinoids.  All of the patient's questions and concerns were addressed.
Detail Level: Detailed
Bactrim Pregnancy And Lactation Text: This medication is Pregnancy Category D and is known to cause fetal risk.  It is also excreted in breast milk.
Minocycline Counseling: Patient advised regarding possible photosensitivity and discoloration of the teeth, skin, lips, tongue and gums.  Patient instructed to avoid sunlight, if possible.  When exposed to sunlight, patients should wear protective clothing, sunglasses, and sunscreen.  The patient was instructed to call the office immediately if the following severe adverse effects occur:  hearing changes, easy bruising/bleeding, severe headache, or vision changes.  The patient verbalized understanding of the proper use and possible adverse effects of minocycline.  All of the patient's questions and concerns were addressed.
Bactrim Counseling:  I discussed with the patient the risks of sulfa antibiotics including but not limited to GI upset, allergic reaction, drug rash, diarrhea, dizziness, photosensitivity, and yeast infections.  Rarely, more serious reactions can occur including but not limited to aplastic anemia, agranulocytosis, methemoglobinemia, blood dyscrasias, liver or kidney failure, lung infiltrates or desquamative/blistering drug rashes.
Azithromycin Counseling:  I discussed with the patient the risks of azithromycin including but not limited to GI upset, allergic reaction, drug rash, diarrhea, and yeast infections.
Patient Specific Counseling (Will Not Stick From Patient To Patient): Hx of shingles affecting the scalp and eye 3 times. It's possible this has affected her nerves to that they are hypersensitive to sensation.
Patient Specific Counseling (Will Not Stick From Patient To Patient): Pt has blackheads so will start Tretinoin there as well as for the rest of the face
Winlevi Pregnancy And Lactation Text: This medication is considered safe during pregnancy and breastfeeding.
Erythromycin Pregnancy And Lactation Text: This medication is Pregnancy Category B and is considered safe during pregnancy. It is also excreted in breast milk.
Doxycycline Counseling:  Patient counseled regarding possible photosensitivity and increased risk for sunburn.  Patient instructed to avoid sunlight, if possible.  When exposed to sunlight, patients should wear protective clothing, sunglasses, and sunscreen.  The patient was instructed to call the office immediately if the following severe adverse effects occur:  hearing changes, easy bruising/bleeding, severe headache, or vision changes.  The patient verbalized understanding of the proper use and possible adverse effects of doxycycline.  All of the patient's questions and concerns were addressed.
Topical Sulfur Applications Pregnancy And Lactation Text: This medication is Pregnancy Category C and has an unknown safety profile during pregnancy. It is unknown if this topical medication is excreted in breast milk.
Detail Level: Generalized
Tetracycline Counseling: Patient counseled regarding possible photosensitivity and increased risk for sunburn.  Patient instructed to avoid sunlight, if possible.  When exposed to sunlight, patients should wear protective clothing, sunglasses, and sunscreen.  The patient was instructed to call the office immediately if the following severe adverse effects occur:  hearing changes, easy bruising/bleeding, severe headache, or vision changes.  The patient verbalized understanding of the proper use and possible adverse effects of tetracycline.  All of the patient's questions and concerns were addressed. Patient understands to avoid pregnancy while on therapy due to potential birth defects.
Aklief Pregnancy And Lactation Text: It is unknown if this medication is safe to use during pregnancy.  It is unknown if this medication is excreted in breast milk.  Breastfeeding women should use the topical cream on the smallest area of the skin for the shortest time needed while breastfeeding.  Do not apply to nipple and areola.
Benzoyl Peroxide Counseling: Patient counseled that medicine may cause skin irritation and bleach clothing.  In the event of skin irritation, the patient was advised to reduce the amount of the drug applied or use it less frequently.   The patient verbalized understanding of the proper use and possible adverse effects of benzoyl peroxide.  All of the patient's questions and concerns were addressed.
High Dose Vitamin A Pregnancy And Lactation Text: High dose vitamin A therapy is contraindicated during pregnancy and breast feeding.
Sarecycline Counseling: Patient advised regarding possible photosensitivity and discoloration of the teeth, skin, lips, tongue and gums.  Patient instructed to avoid sunlight, if possible.  When exposed to sunlight, patients should wear protective clothing, sunglasses, and sunscreen.  The patient was instructed to call the office immediately if the following severe adverse effects occur:  hearing changes, easy bruising/bleeding, severe headache, or vision changes.  The patient verbalized understanding of the proper use and possible adverse effects of sarecycline.  All of the patient's questions and concerns were addressed.
Spironolactone Pregnancy And Lactation Text: This medication can cause feminization of the male fetus and should be avoided during pregnancy. The active metabolite is also found in breast milk.
Azithromycin Pregnancy And Lactation Text: This medication is considered safe during pregnancy and is also secreted in breast milk.
Birth Control Pills Counseling: Birth Control Pill Counseling: I discussed with the patient the potential side effects of OCPs including but not limited to increased risk of stroke, heart attack, thrombophlebitis, deep venous thrombosis, hepatic adenomas, breast changes, GI upset, headaches, and depression.  The patient verbalized understanding of the proper use and possible adverse effects of OCPs. All of the patient's questions and concerns were addressed.
Tazorac Counseling:  Patient advised that medication is irritating and drying.  Patient may need to apply sparingly and wash off after an hour before eventually leaving it on overnight.  The patient verbalized understanding of the proper use and possible adverse effects of tazorac.  All of the patient's questions and concerns were addressed.
High Dose Vitamin A Counseling: Side effects reviewed, pt to contact office should one occur.
Spironolactone Counseling: Patient advised regarding risks of diarrhea, abdominal pain, hyperkalemia, birth defects (for female patients), liver toxicity and renal toxicity. The patient may need blood work to monitor liver and kidney function and potassium levels while on therapy. The patient verbalized understanding of the proper use and possible adverse effects of spironolactone.  All of the patient's questions and concerns were addressed.
Include Pregnancy/Lactation Warning?: No
Detail Level: Simple
Birth Control Pills Pregnancy And Lactation Text: This medication should be avoided if pregnant and for the first 30 days post-partum.
Benzoyl Peroxide Pregnancy And Lactation Text: This medication is Pregnancy Category C. It is unknown if benzoyl peroxide is excreted in breast milk.
Isotretinoin Pregnancy And Lactation Text: This medication is Pregnancy Category X and is considered extremely dangerous during pregnancy. It is unknown if it is excreted in breast milk.
Dapsone Pregnancy And Lactation Text: This medication is Pregnancy Category C and is not considered safe during pregnancy or breast feeding.
Azelaic Acid Counseling: Patient counseled that medicine may cause skin irritation and to avoid applying near the eyes.  In the event of skin irritation, the patient was advised to reduce the amount of the drug applied or use it less frequently.   The patient verbalized understanding of the proper use and possible adverse effects of azelaic acid.  All of the patient's questions and concerns were addressed.
Topical Sulfur Applications Counseling: Topical Sulfur Counseling: Patient counseled that this medication may cause skin irritation or allergic reactions.  In the event of skin irritation, the patient was advised to reduce the amount of the drug applied or use it less frequently.   The patient verbalized understanding of the proper use and possible adverse effects of topical sulfur application.  All of the patient's questions and concerns were addressed.
Dapsone Counseling: I discussed with the patient the risks of dapsone including but not limited to hemolytic anemia, agranulocytosis, rashes, methemoglobinemia, kidney failure, peripheral neuropathy, headaches, GI upset, and liver toxicity.  Patients who start dapsone require monitoring including baseline LFTs and weekly CBCs for the first month, then every month thereafter.  The patient verbalized understanding of the proper use and possible adverse effects of dapsone.  All of the patient's questions and concerns were addressed.
Topical Clindamycin Pregnancy And Lactation Text: This medication is Pregnancy Category B and is considered safe during pregnancy. It is unknown if it is excreted in breast milk.
Azelaic Acid Pregnancy And Lactation Text: This medication is considered safe during pregnancy and breast feeding.
Doxycycline Pregnancy And Lactation Text: This medication is Pregnancy Category D and not consider safe during pregnancy. It is also excreted in breast milk but is considered safe for shorter treatment courses.
Topical Clindamycin Counseling: Patient counseled that this medication may cause skin irritation or allergic reactions.  In the event of skin irritation, the patient was advised to reduce the amount of the drug applied or use it less frequently.   The patient verbalized understanding of the proper use and possible adverse effects of clindamycin.  All of the patient's questions and concerns were addressed.
Isotretinoin Counseling: Patient should get monthly blood tests, not donate blood, not drive at night if vision affected, not share medication, and not undergo elective surgery for 6 months after tx completed. Side effects reviewed, pt to contact office should one occur.

## 2023-01-12 NOTE — PROCEDURE: PRESCRIPTION MEDICATION MANAGEMENT
Detail Level: Zone
Render In Strict Bullet Format?: No
Initiate Treatment: Tretinoin
Plan: Pt prefers topicals
Initiate Treatment: Metronidazole cream bid

## 2023-06-02 ENCOUNTER — HEALTH MAINTENANCE LETTER (OUTPATIENT)
Age: 38
End: 2023-06-02

## 2023-07-13 NOTE — ED PROVIDER NOTES
History     Chief Complaint:  Rectal Bleeding     HPI   Blossom Hale is a 32 year old female with a history of hyperlipidemia, PCOS, and iron deficiency anemia in pregnancy (not on oral iron supplementation) who presents for evaluation of rectal bleeding. The patient reports intermittent rectal bleeding for the last week. She reports it started out as bright red blood on the toilet paper but now she is noticing bright red blood in her underware. She states rectal bleeding is worse when she is walking around. The patient also reports 4 episodes of emesis over the last week. The first 3 episodes were nonbloody, but she vomited today and noticed a few pink streaks in the vomit. The patient has also noticed blood in her urine whenever she wipes, but no blood in the toilet bowl. She reports she has a history of intermittent hematuria in the past related to her polycystic ovarian syndrome. The patient was concerned about the rectal bleeding, prompting the visit to the ED. She also reports intermittent pain in her lower abdomen and lower back. She is having loose stools as well. The patient denies any fevers. She denies any pain with bowel movements, shortness of breath, light-headedness, syncope, or chest pain. She report she has been very stressed recently while studying for her "[employer]. The patient denies any history of chron's, IBS, liver problems, or esophageal varices. She denies regular alcohol use. She had a colonoscopy in 2009 that was normal. She is s/p cholecystectomy. She is not anticoagulated.     Allergies:  Latex  Levaquin  Morphine      Medications:    acetaminophen (TYLENOL) 325 MG tablet  Biotin 10 MG TABS tablet  Calcium Carbonate (CALCIUM 600 PO)  ergocalciferol (ERGOCALCIFEROL) 63625 UNITS capsule  liraglutide (VICTOZA) 18 MG/3ML soln  metFORMIN (GLUCOPHAGE-XR) 500 MG 24 hr tablet  mometasone-formoterol (DULERA) 200-5 MCG/ACT oral inhaler  EPINEPHrine (EPIPEN) 0.3 MG/0.3ML  injection  PROAIR  (90 BASE) MCG/ACT IN AERS    Past Medical History:    Polycystic ovarian syndrome  Hyperlipidemia  Kidney duplication  Depression  Renal stones  Obesity  Miscarraige    Past Surgical History:    Cholecystectomy  Hernia repair     Family History:    Crohn's disease  Lung cancer    Social History:  Smoking status: Former smoker, quit 2011  Alcohol use: Rare  Presents to the ED with her  and daughter   Marital Status:   [2]     Review of Systems   Constitutional: Negative for fever.   HENT: Negative for congestion and sore throat.    Respiratory: Negative for cough and shortness of breath.    Cardiovascular: Negative for chest pain.   Gastrointestinal: Positive for abdominal pain, anal bleeding, diarrhea, nausea and vomiting. Negative for constipation.   Genitourinary: Positive for hematuria. Negative for difficulty urinating, dysuria and frequency.   Neurological: Negative for dizziness and light-headedness.   All other systems reviewed and are negative.      Physical Exam   Patient Vitals for the past 24 hrs:   BP Temp Temp src Pulse Resp SpO2 Weight   05/21/18 2131 135/87 97.4  F (36.3  C) Temporal 83 18 100 % 99.2 kg (218 lb 11.1 oz)       Physical Exam  General:                        Well-nourished                        Speaking in full sentences  Eyes:                        Conjunctiva without injection or scleral icterus                        PERRL                        Mild subconjunctival pallor  ENT:                        Moist mucous membranes                        Posterior oropharynx clear without erythema or exudate                        Nares patent                        Pinnae normal  Neck:                        Full ROM                        No stiffness appreciated  Resp:                        Lungs CTAB                        No crackles, wheezing or audible rubs                        Good air movement  CV:                                         Normal rate, regular rhythm                        S1 and S2 present                        No murmur, gallop or rub  GI:                        BS present                        Abdomen soft without distention                        Tenderness to palpation in supra-pubic and LLQ                        No CVA tenderness                        No guarding or rebound tenderness  :                        (With female chaperone present)                        No external hemorrhoid                        No mass or noted anal fissure                        No gross blood on rectal exam  Skin:                        Warm, dry, well perfused                        No rashes or open wounds on exposed skin  MSK:                        Moves all extremities                        No focal deformities or swelling  Neuro:                        Alert                        Answers questions appropriately                        Moves all extremities equally                        Gait stable  Psych:                        Normal affect, normal mood    Emergency Department Course   Imaging:  Radiographic findings were communicated with the patient who voiced understanding of the findings.    CT Abdomen Pelvis w contrast  1. No acute abnormality. No bowel obstruction or inflammation.  2. Colonic diverticula without acute diverticulitis.  3. IUD in place.  As read by Radiology.    Laboratory:  CBC: WNL (WBC 9.7, HGB 13.0, )   CMP: (H), AST 59(H), o/w WNL (Creatinine 0.62)  INR: 0.99  PTT: 30  Lactic acid: 1.1  Lipase: 261  HCG quant: <5.0  UA: Specific gravity 1.002(L), Blood large, RBC 3(H), Bacteria few, Mucous present, o/w negative    Interventions:  2225: NS 1L IV Bolus  2226: Zofran 4 mg IV    Emergency Department Course:  Past medical records, nursing notes, and vitals reviewed.  2148: I performed an exam of the patient and obtained history, as documented above.  IV inserted and blood drawn.    2258: I  rechecked the patient. Discussed findings.   The patient was sent for a CT abdomen pelvis while in the emergency department, findings above.    0025: I rechecked the patient. Explained findings to the patient.    0049: I spoke to Dr. Lagos on call for GI.     0055: I rechecked the patient. Explained findings and plan to the patient.    0107: I spoke to Dr. Wright of the hospitalist service who accepts the patient for admission.     Findings and plan explained to the Patient who consents to admission.   Discussed the patient with Dr. Wright, who will admit the patient to an obs bed for further monitoring, evaluation, and treatment.     Impression & Plan      Medical Decision Making:  Blossom Hale is a 32 yr old F presenting to the emergency department for evaluation of rectal bleeding.  VS on presentation reveal elevated BP which improved during her ED course though otherwise are unremarkable.  Differential diagnosis includes both upper and lower sources of bleeding.  Given the presence of bright red blood, I am more suspicious for lower sources.  Differential includes diverticular bleed, internal hemorrhoids, AVM, malignancy, colitis (inflammatory, ischemic, infectious), esophageal varices, among others.  Examination reveals a well-appearing female resting comfortably on the gurney.  Labs reveal normal hemoglobin.  Patient not on chronic anticoagulation complicating current presentation.  Rectal exam unremarkable without evidence of gross blood, nor clear obvious explanation for her rectal bleeding.  Given her associated abdominal pain and low back pain, advanced imaging was obtained.  CT abdomen and pelvis demonstrates colonic diverticuli without evidence of diverticulitis or other acute pathology.  Labs reveal elevated liver function tests of unclear significance.  Patient was informed of this as well as the need for follow-up.  She reports one episode of emesis with streaks of blood earlier today.  She has  no prior history of underlying liver disease, nor known esophageal varices.  She has been without ongoing vomiting nor bloody stool during her ED course.  Case was reviewed with GI, who recommended hospital observation for serial hemoglobins and further evaluation.  This was discussed with the patient who is in agreement.  She will be admitted to the hospitalist service under the care of Dr. Wright for further treatment and care.  She has remained hemodynamically stable without need for emergent colonoscopy/EGD.  Questions answered prior to admission.    Diagnosis:    ICD-10-CM   1. Rectal bleeding K62.5   2. Elevated LFTs R79.89     Disposition: Admitted obs under the care of Dr. Adriana Vargas  5/21/2018   [facility] EMERGENCY DEPARTMENT    I, Karla Vargas, am serving as a scribe at 9:48 PM on 5/21/2018 to document services personally performed by Carlos High MD based on my observations and the provider's statements to me.        Carlos High MD  05/22/18 0141     What Type Of Note Output Would You Prefer (Optional)?: Bullet Format Hpi Title: Evaluation of a Skin Lesion

## 2024-01-27 NOTE — PROGRESS NOTES
This encounter was opened in error. Please disregard.  
[facility] Office Note    Blossom Hale   1985    The varicose vein which is causing Ms. Hale's symptoms measures 9.7 mm in diameter and has a reflux time of greater than 500 ms.  The varicosity is located on the right anterior thigh and courses from the previously treated right anterior accessory saphenous vein.  It courses down the right thigh and onto the proximal medial aspect of the right calf.    BEBA Pack M.D.  
declines

## 2024-03-14 ENCOUNTER — APPOINTMENT (OUTPATIENT)
Dept: URBAN - METROPOLITAN AREA CLINIC 260 | Age: 39
Setting detail: DERMATOLOGY
End: 2024-03-15

## 2024-03-14 VITALS — HEIGHT: 62 IN | WEIGHT: 220 LBS

## 2024-03-14 DIAGNOSIS — D18.0 HEMANGIOMA: ICD-10-CM

## 2024-03-14 DIAGNOSIS — D22 MELANOCYTIC NEVI: ICD-10-CM

## 2024-03-14 DIAGNOSIS — L81.4 OTHER MELANIN HYPERPIGMENTATION: ICD-10-CM

## 2024-03-14 DIAGNOSIS — Z71.89 OTHER SPECIFIED COUNSELING: ICD-10-CM

## 2024-03-14 DIAGNOSIS — L28.1 PRURIGO NODULARIS: ICD-10-CM

## 2024-03-14 DIAGNOSIS — L70.0 ACNE VULGARIS: ICD-10-CM

## 2024-03-14 DIAGNOSIS — L71.8 OTHER ROSACEA: ICD-10-CM

## 2024-03-14 DIAGNOSIS — L82.1 OTHER SEBORRHEIC KERATOSIS: ICD-10-CM

## 2024-03-14 PROBLEM — D22.39 MELANOCYTIC NEVI OF OTHER PARTS OF FACE: Status: ACTIVE | Noted: 2024-03-14

## 2024-03-14 PROBLEM — D23.72 OTHER BENIGN NEOPLASM OF SKIN OF LEFT LOWER LIMB, INCLUDING HIP: Status: ACTIVE | Noted: 2024-03-14

## 2024-03-14 PROBLEM — D22.5 MELANOCYTIC NEVI OF TRUNK: Status: ACTIVE | Noted: 2024-03-14

## 2024-03-14 PROBLEM — D18.01 HEMANGIOMA OF SKIN AND SUBCUTANEOUS TISSUE: Status: ACTIVE | Noted: 2024-03-14

## 2024-03-14 PROCEDURE — OTHER MIPS QUALITY: OTHER

## 2024-03-14 PROCEDURE — OTHER ADDITIONAL NOTES: OTHER

## 2024-03-14 PROCEDURE — OTHER PRESCRIPTION MEDICATION MANAGEMENT: OTHER

## 2024-03-14 PROCEDURE — OTHER PRESCRIPTION: OTHER

## 2024-03-14 PROCEDURE — 99214 OFFICE O/P EST MOD 30 MIN: CPT

## 2024-03-14 PROCEDURE — OTHER COUNSELING: OTHER

## 2024-03-14 RX ORDER — TRETIONIN 0.25 MG/G
CREAM TOPICAL QHS
Qty: 45 | Refills: 3 | Status: ERX | COMMUNITY
Start: 2024-03-14

## 2024-03-14 RX ORDER — METRONIDAZOLE 7.5 MG/G
CREAM TOPICAL BID
Qty: 45 | Refills: 4 | Status: ERX | COMMUNITY
Start: 2024-03-14

## 2024-03-14 ASSESSMENT — LOCATION SIMPLE DESCRIPTION DERM
LOCATION SIMPLE: LOWER BACK
LOCATION SIMPLE: RIGHT CHEEK
LOCATION SIMPLE: LEFT CHEEK
LOCATION SIMPLE: GLABELLA
LOCATION SIMPLE: SCALP
LOCATION SIMPLE: UPPER BACK

## 2024-03-14 ASSESSMENT — LOCATION ZONE DERM
LOCATION ZONE: FACE
LOCATION ZONE: TRUNK
LOCATION ZONE: SCALP

## 2024-03-14 ASSESSMENT — LOCATION DETAILED DESCRIPTION DERM
LOCATION DETAILED: INFERIOR THORACIC SPINE
LOCATION DETAILED: RIGHT CENTRAL MALAR CHEEK
LOCATION DETAILED: LEFT SUPERIOR FRONTAL SCALP
LOCATION DETAILED: SUPERIOR LUMBAR SPINE
LOCATION DETAILED: GLABELLA
LOCATION DETAILED: LEFT CENTRAL MALAR CHEEK

## 2024-03-14 NOTE — HPI: FULL BODY SKIN EXAMINATION
How Severe Are Your Spot(S)?: mild
What Type Of Note Output Would You Prefer (Optional)?: Bullet Format
What Is The Reason For Today's Visit?: Full Body Skin Examination
What Is The Reason For Today's Visit? (Being Monitored For X): the development of new lesions
Additional History: Two uncles have had had MM. spot on back that has regimented that was excised

## 2024-03-14 NOTE — PROCEDURE: ADDITIONAL NOTES
Additional Notes: Discussed ‘em oval and patient will schedule an appointment when she’s ready
Render Risk Assessment In Note?: no
Detail Level: Simple

## 2024-03-14 NOTE — PROCEDURE: PRESCRIPTION MEDICATION MANAGEMENT
Continue Regimen: metronidazole 0.75 % topical cream BID
Render In Strict Bullet Format?: No
Detail Level: Zone
Continue Regimen: tretinoin 0.025 % topical cream QHS

## 2024-03-14 NOTE — PROCEDURE: COUNSELING
Detail Level: Generalized
Detail Level: Detailed
Detail Level: Zone
Sunscreen Recommendations: disc that patient would like to have her mole on her right upper chest excised. Will plan to do this at her nov. EC
Patient Specific Counseling (Will Not Stick From Patient To Patient): Size  .5 x .5cm Went over what to expect and patient will schedule a excision appointment
Detail Level: Simple
Minocycline Pregnancy And Lactation Text: This medication is Pregnancy Category D and not consider safe during pregnancy. It is also excreted in breast milk.
Winlevi Counseling:  I discussed with the patient the risks of topical clascoterone including but not limited to erythema, scaling, itching, and stinging. Patient voiced their understanding.
Tazorac Pregnancy And Lactation Text: This medication is not safe during pregnancy. It is unknown if this medication is excreted in breast milk.
Aklief counseling:  Patient advised to apply a pea-sized amount only at bedtime and wait 30 minutes after washing their face before applying.  If too drying, patient may add a non-comedogenic moisturizer.  The most commonly reported side effects including irritation, redness, scaling, dryness, stinging, burning, itching, and increased risk of sunburn.  The patient verbalized understanding of the proper use and possible adverse effects of retinoids.  All of the patient's questions and concerns were addressed.
Erythromycin Counseling:  I discussed with the patient the risks of erythromycin including but not limited to GI upset, allergic reaction, drug rash, diarrhea, increase in liver enzymes, and yeast infections.
Topical Retinoid Pregnancy And Lactation Text: This medication is Pregnancy Category C. It is unknown if this medication is excreted in breast milk.
Topical Retinoid counseling:  Patient advised to apply a pea-sized amount only at bedtime and wait 30 minutes after washing their face before applying.  If too drying, patient may add a non-comedogenic moisturizer. The patient verbalized understanding of the proper use and possible adverse effects of retinoids.  All of the patient's questions and concerns were addressed.
Bactrim Pregnancy And Lactation Text: This medication is Pregnancy Category D and is known to cause fetal risk.  It is also excreted in breast milk.
Minocycline Counseling: Patient advised regarding possible photosensitivity and discoloration of the teeth, skin, lips, tongue and gums.  Patient instructed to avoid sunlight, if possible.  When exposed to sunlight, patients should wear protective clothing, sunglasses, and sunscreen.  The patient was instructed to call the office immediately if the following severe adverse effects occur:  hearing changes, easy bruising/bleeding, severe headache, or vision changes.  The patient verbalized understanding of the proper use and possible adverse effects of minocycline.  All of the patient's questions and concerns were addressed.
Bactrim Counseling:  I discussed with the patient the risks of sulfa antibiotics including but not limited to GI upset, allergic reaction, drug rash, diarrhea, dizziness, photosensitivity, and yeast infections.  Rarely, more serious reactions can occur including but not limited to aplastic anemia, agranulocytosis, methemoglobinemia, blood dyscrasias, liver or kidney failure, lung infiltrates or desquamative/blistering drug rashes.
Azithromycin Counseling:  I discussed with the patient the risks of azithromycin including but not limited to GI upset, allergic reaction, drug rash, diarrhea, and yeast infections.
Patient Specific Counseling (Will Not Stick From Patient To Patient): Pt has blackheads so will start Tretinoin there as well as for the rest of the face
Winlevi Pregnancy And Lactation Text: This medication is considered safe during pregnancy and breastfeeding.
Erythromycin Pregnancy And Lactation Text: This medication is Pregnancy Category B and is considered safe during pregnancy. It is also excreted in breast milk.
Doxycycline Counseling:  Patient counseled regarding possible photosensitivity and increased risk for sunburn.  Patient instructed to avoid sunlight, if possible.  When exposed to sunlight, patients should wear protective clothing, sunglasses, and sunscreen.  The patient was instructed to call the office immediately if the following severe adverse effects occur:  hearing changes, easy bruising/bleeding, severe headache, or vision changes.  The patient verbalized understanding of the proper use and possible adverse effects of doxycycline.  All of the patient's questions and concerns were addressed.
Topical Sulfur Applications Pregnancy And Lactation Text: This medication is Pregnancy Category C and has an unknown safety profile during pregnancy. It is unknown if this topical medication is excreted in breast milk.
Tetracycline Counseling: Patient counseled regarding possible photosensitivity and increased risk for sunburn.  Patient instructed to avoid sunlight, if possible.  When exposed to sunlight, patients should wear protective clothing, sunglasses, and sunscreen.  The patient was instructed to call the office immediately if the following severe adverse effects occur:  hearing changes, easy bruising/bleeding, severe headache, or vision changes.  The patient verbalized understanding of the proper use and possible adverse effects of tetracycline.  All of the patient's questions and concerns were addressed. Patient understands to avoid pregnancy while on therapy due to potential birth defects.
Aklief Pregnancy And Lactation Text: It is unknown if this medication is safe to use during pregnancy.  It is unknown if this medication is excreted in breast milk.  Breastfeeding women should use the topical cream on the smallest area of the skin for the shortest time needed while breastfeeding.  Do not apply to nipple and areola.
Benzoyl Peroxide Counseling: Patient counseled that medicine may cause skin irritation and bleach clothing.  In the event of skin irritation, the patient was advised to reduce the amount of the drug applied or use it less frequently.   The patient verbalized understanding of the proper use and possible adverse effects of benzoyl peroxide.  All of the patient's questions and concerns were addressed.
High Dose Vitamin A Pregnancy And Lactation Text: High dose vitamin A therapy is contraindicated during pregnancy and breast feeding.
Sarecycline Counseling: Patient advised regarding possible photosensitivity and discoloration of the teeth, skin, lips, tongue and gums.  Patient instructed to avoid sunlight, if possible.  When exposed to sunlight, patients should wear protective clothing, sunglasses, and sunscreen.  The patient was instructed to call the office immediately if the following severe adverse effects occur:  hearing changes, easy bruising/bleeding, severe headache, or vision changes.  The patient verbalized understanding of the proper use and possible adverse effects of sarecycline.  All of the patient's questions and concerns were addressed.
Spironolactone Pregnancy And Lactation Text: This medication can cause feminization of the male fetus and should be avoided during pregnancy. The active metabolite is also found in breast milk.
Azithromycin Pregnancy And Lactation Text: This medication is considered safe during pregnancy and is also secreted in breast milk.
Birth Control Pills Counseling: Birth Control Pill Counseling: I discussed with the patient the potential side effects of OCPs including but not limited to increased risk of stroke, heart attack, thrombophlebitis, deep venous thrombosis, hepatic adenomas, breast changes, GI upset, headaches, and depression.  The patient verbalized understanding of the proper use and possible adverse effects of OCPs. All of the patient's questions and concerns were addressed.
Tazorac Counseling:  Patient advised that medication is irritating and drying.  Patient may need to apply sparingly and wash off after an hour before eventually leaving it on overnight.  The patient verbalized understanding of the proper use and possible adverse effects of tazorac.  All of the patient's questions and concerns were addressed.
High Dose Vitamin A Counseling: Side effects reviewed, pt to contact office should one occur.
Spironolactone Counseling: Patient advised regarding risks of diarrhea, abdominal pain, hyperkalemia, birth defects (for female patients), liver toxicity and renal toxicity. The patient may need blood work to monitor liver and kidney function and potassium levels while on therapy. The patient verbalized understanding of the proper use and possible adverse effects of spironolactone.  All of the patient's questions and concerns were addressed.
Include Pregnancy/Lactation Warning?: No
Birth Control Pills Pregnancy And Lactation Text: This medication should be avoided if pregnant and for the first 30 days post-partum.
Benzoyl Peroxide Pregnancy And Lactation Text: This medication is Pregnancy Category C. It is unknown if benzoyl peroxide is excreted in breast milk.
Isotretinoin Pregnancy And Lactation Text: This medication is Pregnancy Category X and is considered extremely dangerous during pregnancy. It is unknown if it is excreted in breast milk.
Dapsone Pregnancy And Lactation Text: This medication is Pregnancy Category C and is not considered safe during pregnancy or breast feeding.
Azelaic Acid Counseling: Patient counseled that medicine may cause skin irritation and to avoid applying near the eyes.  In the event of skin irritation, the patient was advised to reduce the amount of the drug applied or use it less frequently.   The patient verbalized understanding of the proper use and possible adverse effects of azelaic acid.  All of the patient's questions and concerns were addressed.
Topical Sulfur Applications Counseling: Topical Sulfur Counseling: Patient counseled that this medication may cause skin irritation or allergic reactions.  In the event of skin irritation, the patient was advised to reduce the amount of the drug applied or use it less frequently.   The patient verbalized understanding of the proper use and possible adverse effects of topical sulfur application.  All of the patient's questions and concerns were addressed.
Dapsone Counseling: I discussed with the patient the risks of dapsone including but not limited to hemolytic anemia, agranulocytosis, rashes, methemoglobinemia, kidney failure, peripheral neuropathy, headaches, GI upset, and liver toxicity.  Patients who start dapsone require monitoring including baseline LFTs and weekly CBCs for the first month, then every month thereafter.  The patient verbalized understanding of the proper use and possible adverse effects of dapsone.  All of the patient's questions and concerns were addressed.
Topical Clindamycin Pregnancy And Lactation Text: This medication is Pregnancy Category B and is considered safe during pregnancy. It is unknown if it is excreted in breast milk.
Azelaic Acid Pregnancy And Lactation Text: This medication is considered safe during pregnancy and breast feeding.
Doxycycline Pregnancy And Lactation Text: This medication is Pregnancy Category D and not consider safe during pregnancy. It is also excreted in breast milk but is considered safe for shorter treatment courses.
Topical Clindamycin Counseling: Patient counseled that this medication may cause skin irritation or allergic reactions.  In the event of skin irritation, the patient was advised to reduce the amount of the drug applied or use it less frequently.   The patient verbalized understanding of the proper use and possible adverse effects of clindamycin.  All of the patient's questions and concerns were addressed.
Isotretinoin Counseling: Patient should get monthly blood tests, not donate blood, not drive at night if vision affected, not share medication, and not undergo elective surgery for 6 months after tx completed. Side effects reviewed, pt to contact office should one occur.

## 2024-04-18 ENCOUNTER — APPOINTMENT (OUTPATIENT)
Dept: URBAN - METROPOLITAN AREA CLINIC 260 | Age: 39
Setting detail: DERMATOLOGY
End: 2024-04-18

## 2024-04-18 VITALS — HEIGHT: 62 IN | WEIGHT: 240 LBS

## 2024-04-18 VITALS — RESPIRATION RATE: 14 BRPM | HEIGHT: 62 IN | WEIGHT: 240 LBS

## 2024-04-18 DIAGNOSIS — L73.8 OTHER SPECIFIED FOLLICULAR DISORDERS: ICD-10-CM

## 2024-04-18 DIAGNOSIS — L91.8 OTHER HYPERTROPHIC DISORDERS OF THE SKIN: ICD-10-CM

## 2024-04-18 PROBLEM — D23.72 OTHER BENIGN NEOPLASM OF SKIN OF LEFT LOWER LIMB, INCLUDING HIP: Status: ACTIVE | Noted: 2024-04-18

## 2024-04-18 PROCEDURE — 11401 EXC TR-EXT B9+MARG 0.6-1 CM: CPT | Mod: 59

## 2024-04-18 PROCEDURE — 17110 DESTRUCT B9 LESION 1-14: CPT

## 2024-04-18 PROCEDURE — 11200 RMVL SKIN TAGS UP TO&INC 15: CPT | Mod: 59

## 2024-04-18 PROCEDURE — OTHER SKIN TAG REMOVAL: OTHER

## 2024-04-18 PROCEDURE — OTHER MIPS QUALITY: OTHER

## 2024-04-18 PROCEDURE — OTHER COUNSELING: OTHER

## 2024-04-18 PROCEDURE — OTHER EXCISION: OTHER

## 2024-04-18 PROCEDURE — OTHER BENIGN DESTRUCTION: OTHER

## 2024-04-18 PROCEDURE — 12031 INTMD RPR S/A/T/EXT 2.5 CM/<: CPT | Mod: 59

## 2024-04-18 PROCEDURE — OTHER PHOTO-DOCUMENTATION: OTHER

## 2024-04-18 ASSESSMENT — LOCATION DETAILED DESCRIPTION DERM
LOCATION DETAILED: INFERIOR MID FOREHEAD
LOCATION DETAILED: RIGHT AXILLARY VAULT

## 2024-04-18 ASSESSMENT — LOCATION ZONE DERM
LOCATION ZONE: AXILLAE
LOCATION ZONE: FACE

## 2024-04-18 ASSESSMENT — LOCATION SIMPLE DESCRIPTION DERM
LOCATION SIMPLE: INFERIOR FOREHEAD
LOCATION SIMPLE: RIGHT AXILLARY VAULT

## 2024-04-18 NOTE — PROCEDURE: SKIN TAG REMOVAL
Subjective:       Patient ID: Shaneka Ahmadi is a 53 y.o. female.    Chief Complaint: Review labs. 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 Planned to start Zometa pending dental clearance. Currently being treated with Rybrevant Q 2 weeks.    Presenting today for lab review and consideration of next day Rybrevant. She notes feeling well and is without complaints.     Social History     Socioeconomic History    Marital status:    Tobacco Use    Smoking status: Never     Passive exposure: Never    Smokeless tobacco: Never   Substance and Sexual Activity    Alcohol use: Never    Drug use: Never    Sexual activity: Yes     Partners: Male     Birth control/protection: None     Comment: tubal       Past Medical History:   Diagnosis Date    Diabetes mellitus     Hypertension     Malignant neoplasm of lower lobe of left lung 12/9/2022    Rash, drug 7/6/2023    OP NSCLC AMIVANTAMAB-VMJW (Rybrevant) Q4W      Secondary malignant neoplasm of bone 12/5/2022       Family History   Problem Relation Age of Onset    Heart failure Father        Past Surgical History:   Procedure Laterality Date    CATARACT EXTRACTION W/  INTRAOCULAR LENS IMPLANT Right 06/02/2022    FLUOROSCOPY N/A 1/26/2023    Procedure: FLUOROSCOPY/mediport placement;  Surgeon: Marlon Leone MD;  Location: [facility] CATH LAB;  Service: General;  Laterality: N/A;    TUBAL LIGATION      2007--postpartum tubal ligation       Review of Systems   Constitutional:  Negative for activity change, appetite change, chills, fatigue, fever and unexpected weight change.   HENT:  Negative for congestion, mouth sores, nosebleeds, sore throat, trouble swallowing and voice change.    Eyes:  Negative for visual disturbance.   Respiratory:  Negative for cough, chest tightness, shortness of breath and wheezing.    Cardiovascular:  Negative for chest pain, palpitations and leg swelling.   Gastrointestinal:  
Negative for abdominal distention, abdominal pain, blood in stool, constipation, diarrhea, nausea and vomiting.   Genitourinary:  Negative for difficulty urinating, dysuria and hematuria.   Musculoskeletal:  Negative for arthralgias, back pain and myalgias.   Skin:  Negative for pallor, rash and wound.   Neurological:  Negative for dizziness, syncope, weakness and headaches.   Hematological:  Negative for adenopathy. Does not bruise/bleed easily.   Psychiatric/Behavioral:  The patient is not nervous/anxious.          Medication List with Changes/Refills   Current Medications    ALBUTEROL (PROVENTIL/VENTOLIN HFA) 90 MCG/ACTUATION INHALER    Inhale 1-2 puffs into the lungs every 6 (six) hours as needed for Wheezing (cough).    AMLODIPINE (NORVASC) 10 MG TABLET    Take 1 tablet (10 mg total) by mouth once daily.    ATENOLOL (TENORMIN) 50 MG TABLET    Take 1 tab by mouth twice a day    BETAMETHASONE VALERATE 0.1% (VALISONE) 0.1 % OINT    APPLY TOPICALLY TO THE TOP OF THE FEET TWO TIMES A DAY    BLOOD SUGAR DIAGNOSTIC STRP    To check BG 2 times daily, to use with insurance preferred meter    CALCIUM CARBONATE (OS-BRUNILDA) 500 MG CALCIUM (1,250 MG) TABLET    Take 1 tablet (500 mg total) by mouth once daily.    CETIRIZINE (ZYRTEC) 10 MG TABLET    Take 1 tablet (10 mg total) by mouth every evening.    CLINDAMYCIN (CLEOCIN T) 1 % SWAB    APPLY TO AFFECTED AREA(S) TWO TIMES A DAY AS DIRECTED    DESONIDE (DESOWEN) 0.05 % CREAM    APPLY TO AFFECTED AREA(S) TWO TIMES A DAY AS DIRECTED    DEXAMETHASONE (DECADRON) 4 MG TAB    Take 2 tablets (8 mg total) by mouth once daily. Take as directed on days 2, 3, and 16,17 of your chemotherapy cycle starting with cycle 2 forward. Do not take with cycle 1.    DEXAMETHASONE (DECADRON) 4 MG TAB    Take 1 tablet (4 mg total) by mouth As instructed. Take 8 mg the day prior to chemotherapy, and then 8 mg after chemo on days 2, 3, 4    FLUTICASONE PROPIONATE (FLONASE) 50 MCG/ACTUATION NASAL SPRAY    
2 sprays (100 mcg total) by Each Nostril route once daily.    FOLIC ACID (FOLVITE) 1 MG TABLET    Take 1 tablet (1 mg total) by mouth once daily.    HYDROCHLOROTHIAZIDE (HYDRODIURIL) 25 MG TABLET    Take 1 tablet by mouth once daily.    HYDROCODONE-ACETAMINOPHEN (NORCO) 7.5-325 MG PER TABLET    Take 1 tablet by mouth every 4 (four) hours as needed for Pain.    HYDROCODONE-HOMATROPINE 5-1.5 MG/5 ML (HYCODAN) 5-1.5 MG/5 ML SYRP    Take 5 mL by mouth every 4 (four) hours as needed.    KETOCONAZOLE (NIZORAL) 2 % CREAM    1 application  once daily.    LANCETS MISC    To check BG 2 times daily, to use with insurance preferred meter    LISINOPRIL (PRINIVIL,ZESTRIL) 20 MG TABLET    Take 1 tablet by mouth once daily.    LOSARTAN-HYDROCHLOROTHIAZIDE 100-25 MG (HYZAAR) 100-25 MG PER TABLET    Take 1 tablet by mouth once daily.    MELOXICAM (MOBIC) 15 MG TABLET        METFORMIN (GLUCOPHAGE-XR) 500 MG ER 24HR TABLET    Take 1 tab with breakfast and take 2 tabs with dinner    MUPIROCIN (BACTROBAN) 2 % OINTMENT    APPLY TO AFFECTED AREA(S) THREE TIMES A DAY    ONDANSETRON (ZOFRAN-ODT) 8 MG TBDL    Take 1 tablet (8 mg total) by mouth every 8 (eight) hours as needed. Starting with cycle 1 of chemotherapy    ONDANSETRON (ZOFRAN-ODT) 8 MG TBDL    Take 1 tablet (8 mg total) by mouth every 8 (eight) hours as needed (nausea/vomitting).    POTASSIUM CHLORIDE SA (K-DUR,KLOR-CON M) 10 MEQ TABLET    Take 2 tablets (20 mEq total) by mouth once daily.    POTASSIUM CHLORIDE SA (K-DUR,KLOR-CON) 20 MEQ TABLET    Take 20 mEq by mouth.    PRAVASTATIN (PRAVACHOL) 10 MG TABLET    Take 1 tablet (10 mg total) by mouth every evening.    TRETINOIN (RETIN-A) 0.025 % CREAM    Apply topically every evening.    TRUE METRIX GLUCOSE METER MISC        TRUEPLUS LANCETS 33 GAUGE MISC    Apply topically.     Objective:     Vitals:    11/09/23 0801   BP: 111/62   Pulse: 64   Temp: 97.6 °F (36.4 °C)     Lab Results   Component Value Date    WBC 6.63 11/09/2023    
HGB 12.0 11/09/2023    HCT 36.5 (L) 11/09/2023    MCV 84 11/09/2023     (L) 11/09/2023       BMP  Lab Results   Component Value Date     11/09/2023    K 3.8 11/09/2023     11/09/2023    CO2 28 11/09/2023    BUN 17 11/09/2023    CREATININE 0.9 11/09/2023    CALCIUM 8.5 (L) 11/09/2023    ANIONGAP 9 11/09/2023    EGFRNORACEVR >60 11/09/2023     Lab Results   Component Value Date    ALT 22 11/09/2023    AST 13 11/09/2023    ALKPHOS 103 11/09/2023    BILITOT 0.3 11/09/2023         Physical Exam  Vitals reviewed.   Constitutional:       Appearance: She is well-developed.   HENT:      Head: Normocephalic.      Right Ear: External ear normal.      Left Ear: External ear normal.      Nose: Nose normal.   Eyes:      General: Lids are normal. No scleral icterus.        Right eye: No discharge.         Left eye: No discharge.      Conjunctiva/sclera: Conjunctivae normal.   Neck:      Thyroid: No thyroid mass.   Cardiovascular:      Rate and Rhythm: Normal rate and regular rhythm.      Heart sounds: Normal heart sounds.   Pulmonary:      Effort: Pulmonary effort is normal. No respiratory distress.      Breath sounds: Normal breath sounds. No wheezing or rales.   Genitourinary:     Comments: deferred  Musculoskeletal:         General: Normal range of motion.      Cervical back: Normal range of motion.   Skin:     General: Skin is warm and dry.   Neurological:      Mental Status: She is alert and oriented to person, place, and time.   Psychiatric:         Speech: Speech normal.         Behavior: Behavior normal. Behavior is cooperative.         Thought Content: Thought content normal.        Assessment:     Problem List Items Addressed This Visit          Immunology/Multi System    Immunodeficiency due to chemotherapy     Infection precautions            Oncology    Secondary malignant neoplasm of bone     Awaiting dental clearance to initiate Zometa Q 4 weeks         Malignant neoplasm of lower lobe of left 
lung      Cancer Staging   Malignant neoplasm of lower lobe of left lung  Staging form: Lung, AJCC 8th Edition  - Clinical stage from 12/9/2022: Stage IV (cT2, cN2, cM1) - Signed by Reese Mcfarlane MD on 12/9/2022    Patient has continued on Rybrevant Q 2 weeks    Most recent CT c/a/p w/contrast 10/19/2023 with stable findings compared to prior CT 7/2023    Labs reviewed unremarkable. No concerning cytopenias    Proceed with Rybrevant in am as planned. F/u 2 weeks with decoupled labs. Next day Visit and chemo             Endocrine    Morbid obesity     Encourage healthy nutrition and portion control. Encourage daily exercise              Plan:     Secondary malignant neoplasm of bone    Immunodeficiency due to chemotherapy    Malignant neoplasm of lower lobe of left lung    Morbid obesity          Med Onc Chart Routing      Follow up with physician    Follow up with DOLLY 2 weeks. given thanksgiving holiday please arrange labs 11/22 and visit/treatment on 11/24   Infusion scheduling note   Rybrevant   Injection scheduling note    Labs CBC and CMP   Scheduling:  Preferred lab:  Lab interval:     Imaging None      Pharmacy appointment No pharmacy appointment needed      Other referrals       No additional referrals needed           MILAGROS Toribio    
Anesthesia Type: 1% lidocaine with epinephrine
Medical Necessity Information: It is in your best interest to select a reason for this procedure from the list below. All of these items fulfill various CMS LCD requirements except the new and changing color options.
Hemostasis: Drysol
Detail Level: Detailed
Consent: Written consent obtained and the risks of skin tag removal was reviewed with the patient including but not limited to bleeding, pigmentary change, infection, pain, and remote possibility of scarring.
Include Z78.9 (Other Specified Conditions Influencing Health Status) As An Associated Diagnosis?: No
Add Associated Diagnoses If Applicable When Selecting Medical Necessity: Yes
Anesthesia Volume In Cc: 0.2
Medical Necessity Clause: This procedure was medically necessary because the lesions that were treated were:

## 2024-04-18 NOTE — PROCEDURE: BENIGN DESTRUCTION
Add 52 Modifier (Optional): no
Detail Level: Detailed
Anesthesia Type: 2% lidocaine with epinephrine and a 1:10 solution of 8.4% sodium bicarbonate
Consent: The patient's consent was obtained including but not limited to risks of crusting, scabbing, blistering, scarring, darker or lighter pigmentary change, recurrence, incomplete removal and infection.
Medical Necessity Information: It is in your best interest to select a reason for this procedure from the list below. All of these items fulfill various CMS LCD requirements except the new and changing color options.
Medical Necessity Clause: This procedure was medically necessary because the lesions that were treated were:
Post-Care Instructions: I reviewed with the patient in detail post-care instructions. Patient is to wear sunprotection, and avoid picking at any of the treated lesions. Pt may apply Vaseline to crusted or scabbing areas.
Treatment Number (Will Not Render If 0): 0
Anesthesia Volume In Cc: 0.2

## 2024-04-18 NOTE — PROCEDURE: EXCISION

## 2024-04-30 ENCOUNTER — APPOINTMENT (OUTPATIENT)
Dept: URBAN - METROPOLITAN AREA CLINIC 260 | Age: 39
Setting detail: DERMATOLOGY
End: 2024-04-30

## 2024-04-30 DIAGNOSIS — Z48.02 ENCOUNTER FOR REMOVAL OF SUTURES: ICD-10-CM

## 2024-04-30 DIAGNOSIS — Z48.817 ENCOUNTER FOR SURGICAL AFTERCARE FOLLOWING SURGERY ON THE SKIN AND SUBCUTANEOUS TISSUE: ICD-10-CM

## 2024-04-30 PROCEDURE — OTHER PHOTO-DOCUMENTATION: OTHER

## 2024-04-30 PROCEDURE — 99212 OFFICE O/P EST SF 10 MIN: CPT

## 2024-04-30 PROCEDURE — OTHER PRESCRIPTION: OTHER

## 2024-04-30 PROCEDURE — OTHER ORDER TESTS: OTHER

## 2024-04-30 PROCEDURE — OTHER SUTURE REMOVAL (GLOBAL PERIOD): OTHER

## 2024-04-30 PROCEDURE — OTHER ADDITIONAL NOTES: OTHER

## 2024-04-30 PROCEDURE — OTHER DRESSING CHANGE: OTHER

## 2024-04-30 PROCEDURE — OTHER COUNSELING: OTHER

## 2024-04-30 RX ORDER — CLINDAMYCIN HYDROCHLORIDE 300 MG/1
300 MG CAPSULE ORAL BID
Qty: 14 | Refills: 0 | Status: ERX | COMMUNITY
Start: 2024-04-30

## 2024-04-30 ASSESSMENT — LOCATION DETAILED DESCRIPTION DERM
LOCATION DETAILED: LEFT ANTERIOR LATERAL DISTAL THIGH
LOCATION DETAILED: LEFT ANTERIOR DISTAL THIGH

## 2024-04-30 ASSESSMENT — LOCATION SIMPLE DESCRIPTION DERM: LOCATION SIMPLE: LEFT THIGH

## 2024-04-30 ASSESSMENT — LOCATION ZONE DERM: LOCATION ZONE: LEG

## 2024-04-30 NOTE — PROCEDURE: ORDER TESTS
Expected Date Of Service: 04/30/2024
[facility]: [phone]
Bill For Surgical Tray: no
Billing Type: Third-Party Bill

## 2024-04-30 NOTE — PROCEDURE: SUTURE REMOVAL (GLOBAL PERIOD)
Detail Level: Detailed
Add 23921 Cpt? (Important Note: In 2017 The Use Of 98264 Is Being Tracked By Cms To Determine Future Global Period Reimbursement For Global Periods): no

## 2024-04-30 NOTE — PROCEDURE: ADDITIONAL NOTES
Additional Notes: Cleaned with hydrogen peroxide and covered with Vaseline and a bandaid. Advised she do this for wound care once daily. Will send clindamycin for pt to take and may change antibiotic pending culture result. The site does not appear to be infected but patient notes tenderness to the site and due to her diagnosis of mast cell activation hence lowered immune system I will send in prophylactic antibiotics
Detail Level: Simple
Render Risk Assessment In Note?: no

## 2024-04-30 NOTE — PROCEDURE: DRESSING CHANGE
Detail Level: Detailed
Wound Evaluated By: EC
Add [id number] Cpt? (Important Note: In 2017 The Use Of 61972 Is Being Tracked By Cms To Determine Future Global Period Reimbursement For Global Periods): no

## 2024-06-23 ENCOUNTER — HEALTH MAINTENANCE LETTER (OUTPATIENT)
Age: 39
End: 2024-06-23

## 2024-10-08 ENCOUNTER — APPOINTMENT (OUTPATIENT)
Dept: URBAN - METROPOLITAN AREA CLINIC 260 | Age: 39
Setting detail: DERMATOLOGY
End: 2024-10-08

## 2024-10-08 VITALS — HEIGHT: 62 IN | WEIGHT: 230 LBS

## 2024-10-08 DIAGNOSIS — D18.0 HEMANGIOMA: ICD-10-CM

## 2024-10-08 DIAGNOSIS — D22 MELANOCYTIC NEVI: ICD-10-CM

## 2024-10-08 DIAGNOSIS — Z71.89 OTHER SPECIFIED COUNSELING: ICD-10-CM

## 2024-10-08 DIAGNOSIS — L57.8 OTHER SKIN CHANGES DUE TO CHRONIC EXPOSURE TO NONIONIZING RADIATION: ICD-10-CM

## 2024-10-08 DIAGNOSIS — L21.8 OTHER SEBORRHEIC DERMATITIS: ICD-10-CM

## 2024-10-08 DIAGNOSIS — L82.1 OTHER SEBORRHEIC KERATOSIS: ICD-10-CM

## 2024-10-08 PROBLEM — D18.01 HEMANGIOMA OF SKIN AND SUBCUTANEOUS TISSUE: Status: ACTIVE | Noted: 2024-10-08

## 2024-10-08 PROBLEM — D22.71 MELANOCYTIC NEVI OF RIGHT LOWER LIMB, INCLUDING HIP: Status: ACTIVE | Noted: 2024-10-08

## 2024-10-08 PROBLEM — D22.61 MELANOCYTIC NEVI OF RIGHT UPPER LIMB, INCLUDING SHOULDER: Status: ACTIVE | Noted: 2024-10-08

## 2024-10-08 PROBLEM — D22.72 MELANOCYTIC NEVI OF LEFT LOWER LIMB, INCLUDING HIP: Status: ACTIVE | Noted: 2024-10-08

## 2024-10-08 PROBLEM — D22.62 MELANOCYTIC NEVI OF LEFT UPPER LIMB, INCLUDING SHOULDER: Status: ACTIVE | Noted: 2024-10-08

## 2024-10-08 PROBLEM — D22.5 MELANOCYTIC NEVI OF TRUNK: Status: ACTIVE | Noted: 2024-10-08

## 2024-10-08 PROCEDURE — OTHER COUNSELING: OTHER

## 2024-10-08 PROCEDURE — OTHER PRESCRIPTION: OTHER

## 2024-10-08 PROCEDURE — OTHER MIPS QUALITY: OTHER

## 2024-10-08 PROCEDURE — OTHER PRESCRIPTION MEDICATION MANAGEMENT: OTHER

## 2024-10-08 PROCEDURE — 99214 OFFICE O/P EST MOD 30 MIN: CPT

## 2024-10-08 PROCEDURE — OTHER SUNSCREEN RECOMMENDATIONS: OTHER

## 2024-10-08 RX ORDER — KETOCONAZOLE 20 MG/ML
SHAMPOO, SUSPENSION TOPICAL
Qty: 120 | Refills: 3 | Status: ERX | COMMUNITY
Start: 2024-10-08

## 2024-10-08 ASSESSMENT — LOCATION DETAILED DESCRIPTION DERM
LOCATION DETAILED: RIGHT VENTRAL DISTAL FOREARM
LOCATION DETAILED: LEFT INFERIOR LATERAL MIDBACK
LOCATION DETAILED: LEFT VENTRAL PROXIMAL FOREARM
LOCATION DETAILED: LEFT ANTERIOR DISTAL THIGH
LOCATION DETAILED: LEFT ANTECUBITAL SKIN
LOCATION DETAILED: LEFT INFERIOR CENTRAL MALAR CHEEK
LOCATION DETAILED: LEFT ANTERIOR PROXIMAL THIGH
LOCATION DETAILED: EPIGASTRIC SKIN
LOCATION DETAILED: RIGHT ANTERIOR DISTAL THIGH
LOCATION DETAILED: RIGHT ANTERIOR PROXIMAL THIGH
LOCATION DETAILED: LEFT DISTAL DORSAL FOREARM
LOCATION DETAILED: LEFT VENTRAL DISTAL FOREARM
LOCATION DETAILED: INFERIOR THORACIC SPINE
LOCATION DETAILED: LEFT MEDIAL UPPER BACK
LOCATION DETAILED: RIGHT VENTRAL PROXIMAL FOREARM
LOCATION DETAILED: RIGHT PROXIMAL DORSAL FOREARM
LOCATION DETAILED: MIDDLE STERNUM

## 2024-10-08 ASSESSMENT — LOCATION ZONE DERM
LOCATION ZONE: FACE
LOCATION ZONE: TRUNK
LOCATION ZONE: ARM
LOCATION ZONE: LEG

## 2024-10-08 ASSESSMENT — LOCATION SIMPLE DESCRIPTION DERM
LOCATION SIMPLE: LEFT UPPER BACK
LOCATION SIMPLE: LEFT CHEEK
LOCATION SIMPLE: CHEST
LOCATION SIMPLE: LEFT LOWER BACK
LOCATION SIMPLE: LEFT UPPER ARM
LOCATION SIMPLE: UPPER BACK
LOCATION SIMPLE: RIGHT FOREARM
LOCATION SIMPLE: RIGHT THIGH
LOCATION SIMPLE: LEFT THIGH
LOCATION SIMPLE: LEFT FOREARM
LOCATION SIMPLE: ABDOMEN

## 2024-10-08 NOTE — HPI: FULL BODY SKIN EXAMINATION
What Type Of Note Output Would You Prefer (Optional)?: Standard Output
What Is The Reason For Today's Visit?: Full Body Skin Examination
What Is The Reason For Today's Visit? (Being Monitored For X): concerning skin lesions on an annual basis
Additional History: The patient has no areas of concern today.

## 2024-10-08 NOTE — PROCEDURE: PRESCRIPTION MEDICATION MANAGEMENT
Detail Level: Zone
Render In Strict Bullet Format?: No
Initiate Treatment: Ketoconazole 2% shampoo PRN

## 2025-02-05 RX ORDER — KETOCONAZOLE 20 MG/ML
SHAMPOO, SUSPENSION TOPICAL
Qty: 120 | Refills: 3 | Status: ERX

## 2025-08-28 ENCOUNTER — APPOINTMENT (OUTPATIENT)
Dept: URBAN - METROPOLITAN AREA CLINIC 260 | Age: 40
Setting detail: DERMATOLOGY
End: 2025-08-28

## 2025-08-28 DIAGNOSIS — D18.0 HEMANGIOMA: ICD-10-CM

## 2025-08-28 DIAGNOSIS — L72.8 OTHER FOLLICULAR CYSTS OF THE SKIN AND SUBCUTANEOUS TISSUE: ICD-10-CM

## 2025-08-28 DIAGNOSIS — L81.4 OTHER MELANIN HYPERPIGMENTATION: ICD-10-CM

## 2025-08-28 DIAGNOSIS — L82.1 OTHER SEBORRHEIC KERATOSIS: ICD-10-CM

## 2025-08-28 DIAGNOSIS — Z71.89 OTHER SPECIFIED COUNSELING: ICD-10-CM

## 2025-08-28 DIAGNOSIS — L71.8 OTHER ROSACEA: ICD-10-CM

## 2025-08-28 DIAGNOSIS — L82.0 INFLAMED SEBORRHEIC KERATOSIS: ICD-10-CM

## 2025-08-28 DIAGNOSIS — D22 MELANOCYTIC NEVI: ICD-10-CM

## 2025-08-28 PROBLEM — D22.5 MELANOCYTIC NEVI OF TRUNK: Status: ACTIVE | Noted: 2025-08-28

## 2025-08-28 PROBLEM — D18.01 HEMANGIOMA OF SKIN AND SUBCUTANEOUS TISSUE: Status: ACTIVE | Noted: 2025-08-28

## 2025-08-28 PROCEDURE — 17110 DESTRUCT B9 LESION 1-14: CPT

## 2025-08-28 PROCEDURE — OTHER PRESCRIPTION: OTHER

## 2025-08-28 PROCEDURE — OTHER LIQUID NITROGEN: OTHER

## 2025-08-28 PROCEDURE — OTHER MIPS QUALITY: OTHER

## 2025-08-28 PROCEDURE — 99214 OFFICE O/P EST MOD 30 MIN: CPT | Mod: 25

## 2025-08-28 PROCEDURE — OTHER ADDITIONAL NOTES: OTHER

## 2025-08-28 PROCEDURE — OTHER PRESCRIPTION MEDICATION MANAGEMENT: OTHER

## 2025-08-28 PROCEDURE — OTHER COUNSELING: OTHER

## 2025-08-28 RX ORDER — DOXYCYCLINE MONOHYDRATE 100 MG/1
CAPSULE ORAL
Qty: 180 | Refills: 0 | Status: ERX | COMMUNITY
Start: 2025-08-28

## 2025-08-28 RX ORDER — METRONIDAZOLE 7.5 MG/G
CREAM TOPICAL BID
Qty: 45 | Refills: 2 | Status: ERX

## 2025-08-28 ASSESSMENT — LOCATION DETAILED DESCRIPTION DERM
LOCATION DETAILED: LEFT MEDIAL MALAR CHEEK
LOCATION DETAILED: SUPERIOR LUMBAR SPINE
LOCATION DETAILED: RIGHT POSTERIOR SHOULDER
LOCATION DETAILED: MEDIAL FRONTAL SCALP
LOCATION DETAILED: INFERIOR THORACIC SPINE
LOCATION DETAILED: RIGHT MEDIAL MALAR CHEEK

## 2025-08-28 ASSESSMENT — LOCATION ZONE DERM
LOCATION ZONE: SCALP
LOCATION ZONE: ARM
LOCATION ZONE: TRUNK
LOCATION ZONE: FACE

## 2025-08-28 ASSESSMENT — LOCATION SIMPLE DESCRIPTION DERM
LOCATION SIMPLE: RIGHT CHEEK
LOCATION SIMPLE: UPPER BACK
LOCATION SIMPLE: LOWER BACK
LOCATION SIMPLE: FRONTAL SCALP
LOCATION SIMPLE: LEFT CHEEK
LOCATION SIMPLE: RIGHT SHOULDER

## (undated) DEVICE — KIT ENDO TURNOVER/PROCEDURE W/CLEAN A SCOPE LINERS 103888

## (undated) RX ORDER — FENTANYL CITRATE 50 UG/ML
INJECTION, SOLUTION INTRAMUSCULAR; INTRAVENOUS
Status: DISPENSED
Start: 2018-05-23